# Patient Record
Sex: MALE | Race: WHITE | NOT HISPANIC OR LATINO | Employment: OTHER | URBAN - METROPOLITAN AREA
[De-identification: names, ages, dates, MRNs, and addresses within clinical notes are randomized per-mention and may not be internally consistent; named-entity substitution may affect disease eponyms.]

---

## 2017-03-27 ENCOUNTER — HOSPITAL ENCOUNTER (EMERGENCY)
Facility: HOSPITAL | Age: 77
Discharge: HOME | End: 2017-03-27
Attending: EMERGENCY MEDICINE | Admitting: EMERGENCY MEDICINE
Payer: COMMERCIAL

## 2017-03-27 VITALS
TEMPERATURE: 98 F | DIASTOLIC BLOOD PRESSURE: 81 MMHG | RESPIRATION RATE: 16 BRPM | OXYGEN SATURATION: 99 % | SYSTOLIC BLOOD PRESSURE: 181 MMHG | HEART RATE: 73 BPM

## 2017-03-27 DIAGNOSIS — R45.4 OUTBURSTS OF ANGER: Primary | ICD-10-CM

## 2017-03-27 PROCEDURE — 99283 EMERGENCY DEPT VISIT LOW MDM: CPT

## 2019-02-28 ENCOUNTER — APPOINTMENT (EMERGENCY)
Dept: RADIOLOGY | Facility: HOSPITAL | Age: 79
DRG: 871 | End: 2019-02-28
Payer: COMMERCIAL

## 2019-02-28 ENCOUNTER — APPOINTMENT (EMERGENCY)
Dept: CT IMAGING | Facility: HOSPITAL | Age: 79
DRG: 871 | End: 2019-02-28
Payer: COMMERCIAL

## 2019-02-28 ENCOUNTER — HOSPITAL ENCOUNTER (INPATIENT)
Facility: HOSPITAL | Age: 79
LOS: 5 days | Discharge: NURSING, REHAB OR LONG-TERM CARE | DRG: 871 | End: 2019-03-05
Attending: EMERGENCY MEDICINE | Admitting: INTERNAL MEDICINE
Payer: COMMERCIAL

## 2019-02-28 DIAGNOSIS — T83.021A: ICD-10-CM

## 2019-02-28 DIAGNOSIS — J18.9 PNEUMONIA: Primary | ICD-10-CM

## 2019-02-28 DIAGNOSIS — R33.9 URINARY RETENTION: ICD-10-CM

## 2019-02-28 DIAGNOSIS — A41.9 SEPSIS (HCC): ICD-10-CM

## 2019-02-28 DIAGNOSIS — J96.00 ACUTE RESPIRATORY FAILURE (HCC): ICD-10-CM

## 2019-02-28 LAB
ALBUMIN SERPL BCP-MCNC: 2.7 G/DL (ref 3.5–5)
ALP SERPL-CCNC: 54 U/L (ref 46–116)
ALT SERPL W P-5'-P-CCNC: 18 U/L (ref 12–78)
AMMONIA PLAS-SCNC: <10 UMOL/L (ref 11–35)
AMORPH URATE CRY URNS QL MICRO: ABNORMAL /HPF
AMPHETAMINES SERPL QL SCN: NEGATIVE
ANION GAP BLD CALC-SCNC: 15 MMOL/L (ref 4–13)
ANION GAP SERPL CALCULATED.3IONS-SCNC: 7 MMOL/L (ref 4–13)
APAP SERPL-MCNC: <2 UG/ML (ref 10–30)
APTT PPP: 41 SECONDS (ref 26–38)
ARTERIAL PATENCY WRIST A: YES
AST SERPL W P-5'-P-CCNC: 14 U/L (ref 5–45)
ATRIAL RATE: 111 BPM
BACTERIA UR QL AUTO: ABNORMAL /HPF
BARBITURATES UR QL: NEGATIVE
BASE EXCESS BLDA CALC-SCNC: -0.7 MMOL/L
BASE EXCESS BLDA CALC-SCNC: 1.2 MMOL/L
BASE EXCESS BLDA CALC-SCNC: 2.8 MMOL/L
BASOPHILS # BLD AUTO: 0.02 THOUSANDS/ΜL (ref 0–0.1)
BASOPHILS NFR BLD AUTO: 0 % (ref 0–1)
BENZODIAZ UR QL: POSITIVE
BILIRUB SERPL-MCNC: 0.57 MG/DL (ref 0.2–1)
BILIRUB UR QL STRIP: ABNORMAL
BUN BLD-MCNC: 20 MG/DL (ref 5–25)
BUN SERPL-MCNC: 23 MG/DL (ref 5–25)
CA-I BLD-SCNC: 1.2 MMOL/L (ref 1.12–1.32)
CALCIUM SERPL-MCNC: 8.6 MG/DL (ref 8.3–10.1)
CHLORIDE BLD-SCNC: 94 MMOL/L (ref 100–108)
CHLORIDE SERPL-SCNC: 97 MMOL/L (ref 100–108)
CLARITY UR: ABNORMAL
CO2 SERPL-SCNC: 32 MMOL/L (ref 21–32)
COCAINE UR QL: NEGATIVE
COLOR UR: ABNORMAL
CREAT BLD-MCNC: 1 MG/DL (ref 0.6–1.3)
CREAT SERPL-MCNC: 1.27 MG/DL (ref 0.6–1.3)
EOSINOPHIL # BLD AUTO: 0.03 THOUSAND/ΜL (ref 0–0.61)
EOSINOPHIL NFR BLD AUTO: 0 % (ref 0–6)
ERYTHROCYTE [DISTWIDTH] IN BLOOD BY AUTOMATED COUNT: 13.7 % (ref 11.6–15.1)
ETHANOL SERPL-MCNC: <3 MG/DL (ref 0–3)
GFR SERPL CREATININE-BSD FRML MDRD: 54 ML/MIN/1.73SQ M
GFR SERPL CREATININE-BSD FRML MDRD: 72 ML/MIN/1.73SQ M
GLUCOSE SERPL-MCNC: 131 MG/DL (ref 65–140)
GLUCOSE SERPL-MCNC: 178 MG/DL (ref 65–140)
GLUCOSE SERPL-MCNC: 182 MG/DL (ref 65–140)
GLUCOSE UR STRIP-MCNC: NEGATIVE MG/DL
HCO3 BLDA-SCNC: 27.5 MMOL/L (ref 22–28)
HCO3 BLDA-SCNC: 28.2 MMOL/L (ref 22–28)
HCO3 BLDA-SCNC: 28.7 MMOL/L (ref 22–28)
HCT VFR BLD AUTO: 37.2 % (ref 36.5–49.3)
HCT VFR BLD CALC: 37 % (ref 36.5–49.3)
HGB BLD-MCNC: 11.4 G/DL (ref 12–17)
HGB BLDA-MCNC: 12.6 G/DL (ref 12–17)
HGB UR QL STRIP.AUTO: ABNORMAL
HOROWITZ INDEX BLDA+IHG-RTO: 100 MM[HG]
HOROWITZ INDEX BLDA+IHG-RTO: 50 MM[HG]
HOROWITZ INDEX BLDA+IHG-RTO: 65 MM[HG]
IMM GRANULOCYTES # BLD AUTO: 0.08 THOUSAND/UL (ref 0–0.2)
IMM GRANULOCYTES NFR BLD AUTO: 1 % (ref 0–2)
INR PPP: 1.48 (ref 0.86–1.17)
KETONES UR STRIP-MCNC: NEGATIVE MG/DL
LACTATE SERPL-SCNC: 1.4 MMOL/L (ref 0.5–2)
LACTATE SERPL-SCNC: 2.1 MMOL/L (ref 0.5–2)
LEUKOCYTE ESTERASE UR QL STRIP: NEGATIVE
LYMPHOCYTES # BLD AUTO: 1.58 THOUSANDS/ΜL (ref 0.6–4.47)
LYMPHOCYTES NFR BLD AUTO: 11 % (ref 14–44)
MAGNESIUM SERPL-MCNC: 1.8 MG/DL (ref 1.6–2.6)
MCH RBC QN AUTO: 29.6 PG (ref 26.8–34.3)
MCHC RBC AUTO-ENTMCNC: 30.6 G/DL (ref 31.4–37.4)
MCV RBC AUTO: 97 FL (ref 82–98)
METHADONE UR QL: NEGATIVE
MONOCYTES # BLD AUTO: 1.17 THOUSAND/ΜL (ref 0.17–1.22)
MONOCYTES NFR BLD AUTO: 8 % (ref 4–12)
NEUTROPHILS # BLD AUTO: 11.64 THOUSANDS/ΜL (ref 1.85–7.62)
NEUTS SEG NFR BLD AUTO: 80 % (ref 43–75)
NITRITE UR QL STRIP: NEGATIVE
NON-SQ EPI CELLS URNS QL MICRO: ABNORMAL /HPF
NRBC BLD AUTO-RTO: 0 /100 WBCS
NT-PROBNP SERPL-MCNC: 415 PG/ML
O2 CT BLDA-SCNC: 14.3 ML/DL (ref 16–23)
O2 CT BLDA-SCNC: 15.1 ML/DL (ref 16–23)
O2 CT BLDA-SCNC: 16.4 ML/DL (ref 16–23)
OPIATES UR QL SCN: NEGATIVE
OXYHGB MFR BLDA: 95.5 % (ref 94–97)
OXYHGB MFR BLDA: 97.1 % (ref 94–97)
OXYHGB MFR BLDA: 98.7 % (ref 94–97)
P AXIS: 79 DEGREES
PCO2 BLD: 31 MMOL/L (ref 21–32)
PCO2 BLDA: 50.6 MM HG (ref 36–44)
PCO2 BLDA: 57 MM HG (ref 36–44)
PCO2 BLDA: 63.5 MM HG (ref 36–44)
PCP UR QL: NEGATIVE
PEEP RESPIRATORY: 5 CM[H2O]
PH BLDA: 7.25 [PH] (ref 7.35–7.45)
PH BLDA: 7.31 [PH] (ref 7.35–7.45)
PH BLDA: 7.37 [PH] (ref 7.35–7.45)
PH UR STRIP.AUTO: 5 [PH] (ref 4.5–8)
PLATELET # BLD AUTO: 199 THOUSANDS/UL (ref 149–390)
PMV BLD AUTO: 9 FL (ref 8.9–12.7)
PO2 BLDA: 109.2 MM HG (ref 75–129)
PO2 BLDA: 287 MM HG (ref 75–129)
PO2 BLDA: 84.5 MM HG (ref 75–129)
POTASSIUM BLD-SCNC: 4.2 MMOL/L (ref 3.5–5.3)
POTASSIUM SERPL-SCNC: 4.3 MMOL/L (ref 3.5–5.3)
PR INTERVAL: 144 MS
PROCALCITONIN SERPL-MCNC: 5.38 NG/ML
PROT SERPL-MCNC: 7.2 G/DL (ref 6.4–8.2)
PROT UR STRIP-MCNC: ABNORMAL MG/DL
PROTHROMBIN TIME: 18 SECONDS (ref 11.8–14.2)
QRS AXIS: 80 DEGREES
QRSD INTERVAL: 96 MS
QT INTERVAL: 306 MS
QTC INTERVAL: 402 MS
RBC # BLD AUTO: 3.85 MILLION/UL (ref 3.88–5.62)
RBC #/AREA URNS AUTO: ABNORMAL /HPF
SALICYLATES SERPL-MCNC: <3 MG/DL (ref 3–20)
SODIUM BLD-SCNC: 135 MMOL/L (ref 136–145)
SODIUM SERPL-SCNC: 136 MMOL/L (ref 136–145)
SP GR UR STRIP.AUTO: >=1.03 (ref 1–1.03)
SPECIMEN SOURCE: ABNORMAL
T WAVE AXIS: 94 DEGREES
T4 FREE SERPL-MCNC: 1.23 NG/DL (ref 0.76–1.46)
THC UR QL: NEGATIVE
TROPONIN I SERPL-MCNC: <0.02 NG/ML
TSH SERPL DL<=0.05 MIU/L-ACNC: 0.31 UIU/ML (ref 0.36–3.74)
UROBILINOGEN UR QL STRIP.AUTO: 1 E.U./DL
VENT AC: 12
VENT AC: 12
VENT AC: 20
VENT- AC: AC
VENTRICULAR RATE: 104 BPM
VT SETTING VENT: 400 ML
VT SETTING VENT: 450 ML
VT SETTING VENT: 450 ML
WBC # BLD AUTO: 14.52 THOUSAND/UL (ref 4.31–10.16)
WBC #/AREA URNS AUTO: ABNORMAL /HPF

## 2019-02-28 PROCEDURE — 80307 DRUG TEST PRSMV CHEM ANLYZR: CPT | Performed by: EMERGENCY MEDICINE

## 2019-02-28 PROCEDURE — 99291 CRITICAL CARE FIRST HOUR: CPT

## 2019-02-28 PROCEDURE — 81001 URINALYSIS AUTO W/SCOPE: CPT | Performed by: EMERGENCY MEDICINE

## 2019-02-28 PROCEDURE — 5A1935Z RESPIRATORY VENTILATION, LESS THAN 24 CONSECUTIVE HOURS: ICD-10-PCS | Performed by: INTERNAL MEDICINE

## 2019-02-28 PROCEDURE — 71275 CT ANGIOGRAPHY CHEST: CPT

## 2019-02-28 PROCEDURE — 80053 COMPREHEN METABOLIC PANEL: CPT | Performed by: EMERGENCY MEDICINE

## 2019-02-28 PROCEDURE — 82805 BLOOD GASES W/O2 SATURATION: CPT | Performed by: NURSE PRACTITIONER

## 2019-02-28 PROCEDURE — 96361 HYDRATE IV INFUSION ADD-ON: CPT

## 2019-02-28 PROCEDURE — 94644 CONT INHLJ TX 1ST HOUR: CPT

## 2019-02-28 PROCEDURE — 96365 THER/PROPH/DIAG IV INF INIT: CPT

## 2019-02-28 PROCEDURE — 84439 ASSAY OF FREE THYROXINE: CPT | Performed by: EMERGENCY MEDICINE

## 2019-02-28 PROCEDURE — 85610 PROTHROMBIN TIME: CPT | Performed by: EMERGENCY MEDICINE

## 2019-02-28 PROCEDURE — 36415 COLL VENOUS BLD VENIPUNCTURE: CPT | Performed by: EMERGENCY MEDICINE

## 2019-02-28 PROCEDURE — 70450 CT HEAD/BRAIN W/O DYE: CPT

## 2019-02-28 PROCEDURE — 83605 ASSAY OF LACTIC ACID: CPT | Performed by: EMERGENCY MEDICINE

## 2019-02-28 PROCEDURE — 85014 HEMATOCRIT: CPT

## 2019-02-28 PROCEDURE — 80177 DRUG SCRN QUAN LEVETIRACETAM: CPT | Performed by: EMERGENCY MEDICINE

## 2019-02-28 PROCEDURE — 93005 ELECTROCARDIOGRAM TRACING: CPT

## 2019-02-28 PROCEDURE — 36600 WITHDRAWAL OF ARTERIAL BLOOD: CPT

## 2019-02-28 PROCEDURE — 93010 ELECTROCARDIOGRAM REPORT: CPT | Performed by: INTERNAL MEDICINE

## 2019-02-28 PROCEDURE — 74177 CT ABD & PELVIS W/CONTRAST: CPT

## 2019-02-28 PROCEDURE — 84484 ASSAY OF TROPONIN QUANT: CPT | Performed by: EMERGENCY MEDICINE

## 2019-02-28 PROCEDURE — 85730 THROMBOPLASTIN TIME PARTIAL: CPT | Performed by: EMERGENCY MEDICINE

## 2019-02-28 PROCEDURE — 94002 VENT MGMT INPAT INIT DAY: CPT

## 2019-02-28 PROCEDURE — 82805 BLOOD GASES W/O2 SATURATION: CPT | Performed by: EMERGENCY MEDICINE

## 2019-02-28 PROCEDURE — 80320 DRUG SCREEN QUANTALCOHOLS: CPT | Performed by: EMERGENCY MEDICINE

## 2019-02-28 PROCEDURE — 94640 AIRWAY INHALATION TREATMENT: CPT

## 2019-02-28 PROCEDURE — 99291 CRITICAL CARE FIRST HOUR: CPT | Performed by: INTERNAL MEDICINE

## 2019-02-28 PROCEDURE — 84145 PROCALCITONIN (PCT): CPT | Performed by: NURSE PRACTITIONER

## 2019-02-28 PROCEDURE — 83880 ASSAY OF NATRIURETIC PEPTIDE: CPT | Performed by: EMERGENCY MEDICINE

## 2019-02-28 PROCEDURE — 87077 CULTURE AEROBIC IDENTIFY: CPT | Performed by: EMERGENCY MEDICINE

## 2019-02-28 PROCEDURE — 87040 BLOOD CULTURE FOR BACTERIA: CPT | Performed by: EMERGENCY MEDICINE

## 2019-02-28 PROCEDURE — 80329 ANALGESICS NON-OPIOID 1 OR 2: CPT | Performed by: EMERGENCY MEDICINE

## 2019-02-28 PROCEDURE — 82140 ASSAY OF AMMONIA: CPT | Performed by: EMERGENCY MEDICINE

## 2019-02-28 PROCEDURE — 85025 COMPLETE CBC W/AUTO DIFF WBC: CPT | Performed by: EMERGENCY MEDICINE

## 2019-02-28 PROCEDURE — 84443 ASSAY THYROID STIM HORMONE: CPT | Performed by: EMERGENCY MEDICINE

## 2019-02-28 PROCEDURE — 87631 RESP VIRUS 3-5 TARGETS: CPT | Performed by: NURSE PRACTITIONER

## 2019-02-28 PROCEDURE — 80047 BASIC METABLC PNL IONIZED CA: CPT

## 2019-02-28 PROCEDURE — 82948 REAGENT STRIP/BLOOD GLUCOSE: CPT

## 2019-02-28 PROCEDURE — 99292 CRITICAL CARE ADDL 30 MIN: CPT

## 2019-02-28 PROCEDURE — 71045 X-RAY EXAM CHEST 1 VIEW: CPT

## 2019-02-28 PROCEDURE — 83735 ASSAY OF MAGNESIUM: CPT | Performed by: EMERGENCY MEDICINE

## 2019-02-28 RX ORDER — LEVOTHYROXINE SODIUM 0.2 MG/1
200 TABLET ORAL DAILY
COMMUNITY

## 2019-02-28 RX ORDER — DOCUSATE SODIUM 100 MG/1
100 CAPSULE, LIQUID FILLED ORAL 2 TIMES DAILY
COMMUNITY

## 2019-02-28 RX ORDER — NITROGLYCERIN 0.4 MG/1
0.4 TABLET SUBLINGUAL
COMMUNITY

## 2019-02-28 RX ORDER — RISPERIDONE 1 MG/1
1 TABLET, FILM COATED ORAL 2 TIMES DAILY
COMMUNITY
End: 2019-03-05 | Stop reason: HOSPADM

## 2019-02-28 RX ORDER — LANOLIN ALCOHOL/MO/W.PET/CERES
1 CREAM (GRAM) TOPICAL DAILY
COMMUNITY

## 2019-02-28 RX ORDER — SODIUM CHLORIDE FOR INHALATION 0.9 %
3 VIAL, NEBULIZER (ML) INHALATION ONCE
Status: COMPLETED | OUTPATIENT
Start: 2019-02-28 | End: 2019-02-28

## 2019-02-28 RX ORDER — TRAMADOL HYDROCHLORIDE 50 MG/1
50 TABLET ORAL 2 TIMES DAILY
COMMUNITY
End: 2019-03-05 | Stop reason: HOSPADM

## 2019-02-28 RX ORDER — PROPOFOL 10 MG/ML
5 INJECTION, EMULSION INTRAVENOUS
Status: DISCONTINUED | OUTPATIENT
Start: 2019-02-28 | End: 2019-03-01

## 2019-02-28 RX ORDER — ARFORMOTEROL TARTRATE 15 UG/2ML
15 SOLUTION RESPIRATORY (INHALATION) 2 TIMES DAILY
COMMUNITY

## 2019-02-28 RX ORDER — GABAPENTIN 300 MG/1
100 CAPSULE ORAL 2 TIMES DAILY
COMMUNITY

## 2019-02-28 RX ORDER — MULTIVITAMIN
1 TABLET ORAL DAILY
COMMUNITY

## 2019-02-28 RX ORDER — SENNA PLUS 8.6 MG/1
1 TABLET ORAL 2 TIMES DAILY
COMMUNITY

## 2019-02-28 RX ORDER — PROPOFOL 10 MG/ML
INJECTION, EMULSION INTRAVENOUS
Status: COMPLETED
Start: 2019-02-28 | End: 2019-02-28

## 2019-02-28 RX ORDER — GUAIFENESIN 600 MG
1200 TABLET, EXTENDED RELEASE 12 HR ORAL EVERY 12 HOURS SCHEDULED
COMMUNITY

## 2019-02-28 RX ORDER — ACETAMINOPHEN 325 MG/1
650 TABLET ORAL EVERY 6 HOURS PRN
COMMUNITY

## 2019-02-28 RX ORDER — FUROSEMIDE 40 MG/1
40 TABLET ORAL DAILY
COMMUNITY

## 2019-02-28 RX ORDER — TAMSULOSIN HYDROCHLORIDE 0.4 MG/1
0.4 CAPSULE ORAL
COMMUNITY
End: 2019-03-05 | Stop reason: HOSPADM

## 2019-02-28 RX ORDER — LEVETIRACETAM 250 MG/1
500 TABLET ORAL EVERY 12 HOURS SCHEDULED
Status: DISCONTINUED | OUTPATIENT
Start: 2019-02-28 | End: 2019-02-28

## 2019-02-28 RX ORDER — BUDESONIDE 0.5 MG/2ML
0.5 INHALANT ORAL 2 TIMES DAILY
COMMUNITY

## 2019-02-28 RX ORDER — ALENDRONATE SODIUM 10 MG/1
10 TABLET ORAL
COMMUNITY

## 2019-02-28 RX ORDER — BUSPIRONE HYDROCHLORIDE 5 MG/1
5 TABLET ORAL 3 TIMES DAILY
COMMUNITY
End: 2019-03-05 | Stop reason: HOSPADM

## 2019-02-28 RX ORDER — LEVOTHYROXINE SODIUM 0.1 MG/1
200 TABLET ORAL
Status: DISCONTINUED | OUTPATIENT
Start: 2019-03-01 | End: 2019-03-05 | Stop reason: HOSPADM

## 2019-02-28 RX ORDER — OMEPRAZOLE 20 MG/1
20 CAPSULE, DELAYED RELEASE ORAL
COMMUNITY

## 2019-02-28 RX ORDER — GABAPENTIN 100 MG/1
100 CAPSULE ORAL 2 TIMES DAILY
Status: DISCONTINUED | OUTPATIENT
Start: 2019-02-28 | End: 2019-03-05 | Stop reason: HOSPADM

## 2019-02-28 RX ORDER — LEVETIRACETAM 500 MG/1
500 TABLET ORAL EVERY 12 HOURS SCHEDULED
COMMUNITY

## 2019-02-28 RX ORDER — OXCARBAZEPINE 300 MG/1
300 TABLET, FILM COATED ORAL
COMMUNITY

## 2019-02-28 RX ORDER — DILTIAZEM HYDROCHLORIDE 120 MG/1
120 CAPSULE, EXTENDED RELEASE ORAL DAILY
COMMUNITY

## 2019-02-28 RX ORDER — ISOSORBIDE MONONITRATE 60 MG/1
60 TABLET, EXTENDED RELEASE ORAL DAILY
COMMUNITY

## 2019-02-28 RX ORDER — CHLORHEXIDINE GLUCONATE 0.12 MG/ML
15 RINSE ORAL EVERY 12 HOURS SCHEDULED
Status: DISCONTINUED | OUTPATIENT
Start: 2019-02-28 | End: 2019-03-05 | Stop reason: HOSPADM

## 2019-02-28 RX ORDER — ONDANSETRON 4 MG/1
4 TABLET, FILM COATED ORAL EVERY 6 HOURS PRN
COMMUNITY

## 2019-02-28 RX ORDER — PROPOFOL 10 MG/ML
5 INJECTION, EMULSION INTRAVENOUS
Status: DISCONTINUED | OUTPATIENT
Start: 2019-02-28 | End: 2019-02-28

## 2019-02-28 RX ADMIN — PROPOFOL 5 MCG/KG/MIN: 10 INJECTION, EMULSION INTRAVENOUS at 15:00

## 2019-02-28 RX ADMIN — FENTANYL CITRATE 50 MCG/HR: 50 INJECTION, SOLUTION INTRAMUSCULAR; INTRAVENOUS at 14:22

## 2019-02-28 RX ADMIN — METRONIDAZOLE 500 MG: 500 INJECTION, SOLUTION INTRAVENOUS at 16:17

## 2019-02-28 RX ADMIN — VANCOMYCIN HYDROCHLORIDE 1750 MG: 1 INJECTION, POWDER, LYOPHILIZED, FOR SOLUTION INTRAVENOUS at 16:55

## 2019-02-28 RX ADMIN — CEFEPIME HYDROCHLORIDE 2000 MG: 1 INJECTION, POWDER, FOR SOLUTION INTRAMUSCULAR; INTRAVENOUS at 15:37

## 2019-02-28 RX ADMIN — ALBUTEROL SULFATE 10 MG: 2.5 SOLUTION RESPIRATORY (INHALATION) at 15:52

## 2019-02-28 RX ADMIN — IPRATROPIUM BROMIDE 1 MG: 0.5 SOLUTION RESPIRATORY (INHALATION) at 15:52

## 2019-02-28 RX ADMIN — PROPOFOL 5 MCG/KG/MIN: 10 INJECTION, EMULSION INTRAVENOUS at 15:36

## 2019-02-28 RX ADMIN — IOHEXOL 100 ML: 350 INJECTION, SOLUTION INTRAVENOUS at 15:24

## 2019-02-28 RX ADMIN — ISODIUM CHLORIDE 9 ML: 0.03 SOLUTION RESPIRATORY (INHALATION) at 15:52

## 2019-02-28 RX ADMIN — SODIUM CHLORIDE 1000 ML: 0.9 INJECTION, SOLUTION INTRAVENOUS at 13:52

## 2019-02-28 RX ADMIN — LEVETIRACETAM 500 MG: 100 INJECTION, SOLUTION INTRAVENOUS at 21:45

## 2019-02-28 NOTE — ED NOTES
Propofol not started at this time due to not having a weight on patient        Jordana Serrano RN  02/28/19 6831

## 2019-02-28 NOTE — H&P
History & Physical Exam - Critical Care   Dee Chow 66 y o  male MRN: [id number]  Unit/Bed#: ED 20 Encounter: [id number]      Assessment/Plan:  1  Sepsis due to hospital acquired pneumonia  · Will admit to critical care and will require greater than 2 midnight hospitalization stay  · Likely secondary to multilobar pneumonia  · BP responded to initial fluid bolus in ED  · Patient started on Cefepime, Vancomycin with concern for HCAP and flagyl with concern for aspiration  · Will likely perform bronchoscopy and sputum culture  · Blood cultures pending   · Patient with reported history of CHF, on 40mg lasix daily  Will start on light MF @ 75cc/hr as lactic has already cleared after 1L bolus  · Consider 5% albumin bolus if hypotension recurs   · Will consider adding vasoactive medication if patient develops refractory hypotension despite fluid bolus  2  Multilobar Pneumonia, possible HCAP  · Continue Cefepime, Vancomycin, and Flagyl until sputum culture returns  · Will check and trend procalcitonin  · Will send legionella, strep, and influenza  · Will continue ventilator support for hypoxia   · Will treat fevers with rectal tylenol  · Start xopenex and atrovent duoneb Q6  · Start solumedrol 60mg with history of COPD  · Pulmonary toilet and chest PT  3  Acute Hypoxic Respiratory Failure  · Continue mechanical ventilation   · AC 12/450/65/5 0/65%  · Continue to wean O2  4  Atrial Fibrillation  · Continue to monitor HR  · On diltiazem 120mg at home, consider restarting when appropriate  · Continue Eliquis  · HR goal <110  · Consider treating tachycardia with fluids before medical management  5  COPD  · Likely exacerbated by PNA  · Continue nebs  · Consider continuing Brovana nebs, home med  On advair daily  · Steroids  · Chest PT  6  GERD  · Start protonix ppx while intubated  7  Hypothyroid  · Continue synthroid, TSH 0 310  8   Reported CHF  · No know ECHO history  · Consider repeat  · Montior UO, restart lasix once BP stable  9  Possible Seizure D/O  · Continue Keppra 500mg  · Continue Gabapentin  · If patient encephalopathy does not resolve, consider seizure workup   10  Bipolar Disorder  · Continue Trileptal and Risperdal when appropriate  11  Possible urethral injury secondary to gao  · Concerning for injury  · May need urology consult  12  Chronic Hypotension  · Continue home isosorbide  · Monitor closely      Critical Care Time: 35 minutes  Documented critical care time excludes any procedures documented elsewhere  It also excludes any family updates    _____________________________________________________________________      HPI:    Ash Morales is a 66 y o  male with a history of COPD, heart failure, atrial fibrillation, chronic anticoagulation, and dementia presents to the ICU for management of pneumonia and acute hypoxic respiratory failure  Patient presents to the emergency department after apparent respiratory arrest at nursing home  According to the nursing home staff, patient had a witnessed collapse  He was walking with his walker and suddenly fell  He was unresponsive  Upon EMS arrival patient did have a pulse but was hypoxic and not breathing well  He was intubated EN route without sedation or paralytics  He was given Versed EN route for comfort  Upon arrival patient was slightly hypotensive  He received a 1 L fluid bolus in the emergency department  Sepsis was suspected the patient was started on broad-spectrum antibiotics including vancomycin, cefepime and Flagyl for concern of aspiration  Patient had a CT PE study including abdomen and pelvis which was negative for PE  He was found to have a right middle lobe consolidation with possible mucus plug or endobronchial lesion  Patient also with multifocal patchy consolidations in the right lower lobe that could be suspicious for aspiration pneumonia    Patient was started on propofol as well as fentanyl in the emergency department for sedation and analgesia  Patient was also found to have signs concerning for urethral Guzman balloon inflation  Patient is currently intubated and unable to answer questions  Of note patient was admitted to the hospital approximately 10 days ago for COPD exacerbation  Patient was treated with azithromycin, prednisone, and nebulizer treatments  He was afebrile with no leukocytosis at time of discharge and was discharged back to [address] [place] [address]  Review of Systems:  Review of Systems   Unable to perform ROS: Intubated     Full 12 point review of systems was performed  Aside from what was mentioned in the HPI, it is otherwise negative  Historical Information   Past Medical History:   Diagnosis Date    Acute respiratory failure with hypoxia (HCC)     Anemia     Atrial fibrillation (HCC)     Benign prostatic hyperplasia without lower urinary tract symptoms     Cardiac disease     Convulsion (HCC)     COPD (chronic obstructive pulmonary disease) (HCC)     Dementia     Disease of thyroid gland     Encephalopathy     GERD (gastroesophageal reflux disease)     Heart failure (HCC)     Hyperlipidemia     Hypo-osmolality and hyponatremia     Hypothyroidism     Peripheral vascular disease ([facility] [address] )     Psychiatric disorder     schizoaffective    Pulmonary collapse      No past surgical history on file  Social History   Social History     Substance and Sexual Activity   Alcohol Use No     Social History     Substance and Sexual Activity   Drug Use No     Social History     Tobacco Use   Smoking Status Former Smoker       Family History:   No family history on file      Medications:  Pertinent medications were reviewed    Current Facility-Administered Medications:  fentaNYL 50 mcg/hr Intravenous Continuous Sherice Stapleton DO Last Rate: 50 mcg/hr (02/28/19 1422)   propofol 5 mcg/kg/min Intravenous Titrated Sherice Stapleton DO    vancomycin 15 mg/kg Intravenous Once [employer], DO Last Rate: 1,750 mg (02/28/19 1655)         Allergies   Allergen Reactions    Cephalexin     Depakote [Valproic Acid]     Haldol [Haloperidol]     Lithium     Mesoridazine     Penicillins     Prolixin [Fluphenazine]     Thorazine [Chlorpromazine]     Tuberculin Tests          Vitals:   BP 99/54 (BP Location: Left arm)   Pulse 78   Temp 98 6 °F (37 °C) (Probe)   Resp 14   Wt 114 kg (252 lb 3 3 oz)   SpO2 100%   There is no height or weight on file to calculate BMI  SpO2: 100 %,   SpO2 Activity: At Rest,   O2 Device: (vent)      Intake/Output Summary (Last 24 hours) at 2/28/2019 1814  Last data filed at 2/28/2019 1650  Gross per 24 hour   Intake 1150 ml   Output 10 ml   Net 1140 ml     Invasive Devices     Peripheral Intravenous Line            Peripheral IV 02/28/19 Left Hand less than 1 day    Peripheral IV 02/28/19 Left Wrist less than 1 day    Peripheral IV 02/28/19 Right Antecubital less than 1 day    Peripheral IV 02/28/19 Right Hand less than 1 day          Drain            NG/OG/Enteral Tube Orogastric 16 Fr Center mouth less than 1 day    Urethral Catheter Temperature probe 16 Fr  less than 1 day          Airway            ETT  8 mm less than 1 day                Physical Exam:  Gen:  No acute distress, intubated, resting comfortably  HE:  No acute signs of head trauma  No bleeding from ears  ENT:  Pupils 2+, equal reactive, round, symmetric  No signs of nasal trauma  Patient intubated  Neck/lymph:  Neck supple, no lymphadenopathy  Chest:  Rhonchi noted in bilateral anterior upper lobes, crackles in bilateral anterior lower lobes  Cor: Heart RRR  No murmurs or rubs noted  Abd: Nondistended, decreased bowel sounds  Ext:  Trace lower extremity edema, nonpitting  Neuro: GCS  Skin: Warm and dry, no bruising noted  Blood noted in gao tubing      Diagnostic Data:  Lab: I have personally reviewed pertinent lab results  ,   CBC:  Results from last 7 days   Lab Units 02/28/19  1346 02/28/19  1340   WBC Thousand/uL  -- 14 52*   HEMOGLOBIN g/dL  --  11 4*   I STAT HEMOGLOBIN g/dl 12 6  --    HEMATOCRIT %  --  37 2   HEMATOCRIT, ISTAT % 37  --    PLATELETS Thousands/uL  --  199      CMP: Lab Results   Component Value Date    SODIUM 136 02/28/2019    K 4 3 02/28/2019    CL 97 (L) 02/28/2019    CO2 31 02/28/2019    BUN 23 02/28/2019    CREATININE 1 27 02/28/2019    GLUCOSE 182 (H) 02/28/2019    CALCIUM 8 6 02/28/2019    AST 14 02/28/2019    ALT 18 02/28/2019    ALKPHOS 54 02/28/2019    EGFR 72 02/28/2019   ,   PT/INR:   Lab Results   Component Value Date    INR 1 48 (H) 02/28/2019   ,   Troponin:   Lab Results   Component Value Date    TROPONINI <0 02 02/28/2019   ,   Magnesium: No components found for: MAG,  Phosphorous: No results found for: PHOS    ABG: Lab Results   Component Value Date    PHART 7 254 (L) 02/28/2019    AWX4CXR 63 5 (HH) 02/28/2019    PO2ART 287 0 (H) 02/28/2019    ZCJ9LKU 27 5 02/28/2019    BEART -0 7 02/28/2019    SOURCE Brachial, Right 02/28/2019   ,     Microbiology:  [facility] pending    Imaging: I have personally reviewed the pertinent imaging studies on the PACS system  2/28- CXR- Endotracheal tube tip 3 8 cm above the irma  Bibasilar right greater than left airspace disease concerning for pneumonia  Mild shifting of the cardiac silhouette toward the right could be related to atelectasis/right lung volume loss  2/28 CTH- No acute intracranial abnormality  2/28 CTA PE w/ A/P- Right middle lobe consolidation with air bronchograms which may represent bronchopneumonia  However, there is partial opacification of the proximal right middle lobe bronchus which may represent a mucous plug or endobronchial lesion  Bronchoscopy is   suggested for further evaluation  Multifocal patchy airspace consolidations are also noted within the right lower lobe suspicious for bronchopneumonia, possibly aspiration pneumonia    A repeat CT chest in 6-8 weeks line treatment is recommended to assess for improvement/resolution and exclude an underlying lesion      Scattered bilateral tree-in-bud opacities suspicious for an infectious or inflammatory bronchiolitis      No acute intra-abdominal abnormality  No free air or free fluid      Guzman catheter with the balloon portion located within the membranous urethra  Consider repositioning      Moderate amount of stool noted throughout the colon      Mild right hilar lymphadenopathy likely reactive in nature        EKG/telemetry/Echo:         VTE Prophylaxis: Heparin and SCD's    Code Status: No Order    Portions of the record may have been created with voice recognition software  Occasional wrong word or "sound a like" substitutions may have occurred due to the inherent limitations of voice recognition software  Read the chart carefully and recognize, using context, where substitutions have occurred

## 2019-02-28 NOTE — ED PROVIDER NOTES
History  Chief Complaint   Patient presents with    Respiratory Arrest     SOB from [facility], then went unresponsive  pt intubated on arrival      66 y o  M w/ h/o COPD, CHF, afib, seizures, schzioaffective disorder p/w unresponsiveness/resp distress  Per EMS, pt was complaining of resp distress and went unresponsive  Pt was intubated in the field without sedation (no gag per EMS)  Pt given 2mg versed after intubation  History provided by:  EMS personnel  History limited by:  Patient unresponsive and intubated   used: No        Prior to Admission Medications   Prescriptions Last Dose Informant Patient Reported? Taking? Linaclotide (LINZESS) 145 MCG CAPS   Yes Yes   Sig: Take 145 mcg by mouth daily   Multiple Vitamin (MULTIVITAMIN) tablet   Yes Yes   Sig: Take 1 tablet by mouth daily   NAPROXEN PO   Yes Yes   Sig: Take 550 mg by mouth 2 (two) times a day as needed   OXcarbazepine (TRILEPTAL) 300 mg tablet   Yes Yes   Sig: Take 300 mg by mouth daily at bedtime   acetaminophen (TYLENOL) 325 mg tablet   Yes Yes   Sig: Take 650 mg by mouth every 6 (six) hours as needed for mild pain   alendronate (FOSAMAX) 10 mg tablet   Yes Yes   Sig: Take 10 mg by mouth daily before breakfast   apixaban (ELIQUIS) 5 mg   Yes Yes   Sig: Take 5 mg by mouth 2 (two) times a day   arformoterol (BROVANA) 15 mcg/2 mL nebulizer solution   Yes Yes   Sig: Take 15 mcg by nebulization 2 (two) times a day   budesonide (PULMICORT) 0 5 mg/2 mL nebulizer solution   Yes Yes   Sig: Take 0 5 mg by nebulization 2 (two) times a day Rinse mouth after use     busPIRone (BUSPAR) 5 mg tablet   Yes Yes   Sig: Take 5 mg by mouth 3 (three) times a day   diltiazem (TIAZAC) 120 MG 24 hr capsule   Yes Yes   Sig: Take 120 mg by mouth daily   docusate sodium (COLACE) 100 mg capsule   Yes Yes   Sig: Take 100 mg by mouth 2 (two) times a day   fluticasone-salmeterol (ADVAIR) 250-50 mcg/dose inhaler   Yes Yes   Sig: Inhale 1 puff 2 (two) times a day Rinse mouth after use     furosemide (LASIX) 40 mg tablet   Yes Yes   Sig: Take 40 mg by mouth daily   gabapentin (NEURONTIN) 300 mg capsule   Yes Yes   Sig: Take 100 mg by mouth 2 (two) times a day   guaiFENesin (MUCINEX) 600 mg 12 hr tablet   Yes Yes   Sig: Take 1,200 mg by mouth every 12 (twelve) hours   isosorbide mononitrate (IMDUR) 60 mg 24 hr tablet   Yes Yes   Sig: Take 60 mg by mouth daily   levETIRAcetam (KEPPRA) 500 mg tablet   Yes Yes   Sig: Take 500 mg by mouth every 12 (twelve) hours   levothyroxine 200 mcg tablet   Yes Yes   Sig: Take 200 mcg by mouth daily   magnesium hydroxide (MILK OF MAGNESIA) 400 mg/5 mL oral suspension   Yes Yes   Sig: Take 30 mL by mouth daily as needed for constipation   nitroglycerin (NITROSTAT) 0 4 mg SL tablet   Yes Yes   Sig: Place 0 4 mg under the tongue every 5 (five) minutes as needed for chest pain   omeprazole (PriLOSEC) 20 mg delayed release capsule   Yes Yes   Sig: Take 20 mg by mouth daily at bedtime   ondansetron (ZOFRAN) 4 mg tablet   Yes Yes   Sig: Take 4 mg by mouth every 6 (six) hours as needed for nausea or vomiting   risperiDONE (RisperDAL) 1 mg tablet   Yes Yes   Sig: Take 1 mg by mouth 2 (two) times a day   senna (SENOKOT) 8 6 MG tablet   Yes Yes   Sig: Take 1 tablet by mouth 2 (two) times a day   tamsulosin (FLOMAX) 0 4 mg   Yes Yes   Sig: Take 0 4 mg by mouth daily with dinner   traMADol (ULTRAM) 50 mg tablet   Yes Yes   Sig: Take 50 mg by mouth 2 (two) times a day   white petrolatum-mineral oil (EUCERIN,HYDROCERIN) cream   Yes Yes   Sig: Apply 1 application topically daily      Facility-Administered Medications: None       Past Medical History:   Diagnosis Date    Acute respiratory failure with hypoxia (HCC)     Anemia     Atrial fibrillation (HCC)     Benign prostatic hyperplasia without lower urinary tract symptoms     Cardiac disease     Convulsion (HCC)     COPD (chronic obstructive pulmonary disease) ([facility] [address] )     Dementia     Disease of thyroid gland     Encephalopathy     GERD (gastroesophageal reflux disease)     Heart failure (HCC)     Hyperlipidemia     Hypo-osmolality and hyponatremia     Hypothyroidism     Peripheral vascular disease (HCC)     Psychiatric disorder     schizoaffective    Pulmonary collapse        No past surgical history on file  No family history on file  I have reviewed and agree with the history as documented  Social History     Tobacco Use    Smoking status: Former Smoker   Substance Use Topics    Alcohol use: No    Drug use: No        Review of Systems   Unable to perform ROS: Intubated       Physical Exam  Physical Exam   Constitutional: He appears well-developed and well-nourished  Non-toxic appearance  He does not have a sickly appearance  He does not appear ill  No distress  He is intubated  HENT:   Head: Normocephalic and atraumatic  Mouth/Throat: Oropharynx is clear and moist    Eyes: Right pupil is not reactive (Pinpoint)  Right pupil is round  Left pupil is not reactive (Pinpoint)  Left pupil is round  Neck: No JVD present  Cardiovascular: Regular rhythm, S1 normal, S2 normal, normal heart sounds, intact distal pulses and normal pulses  Tachycardia present  Exam reveals no gallop and no friction rub  No murmur heard  Pulmonary/Chest: He is intubated  He has no wheezes  He has rhonchi  He has rales  He exhibits no tenderness  Abdominal: Soft  Bowel sounds are normal  He exhibits distension  There is no tenderness  There is no rebound and no guarding  Musculoskeletal: He exhibits edema (b/l)  Neurological: He is unresponsive  GCS eye subscore is 1  GCS verbal subscore is 1  GCS motor subscore is 1  Skin: Skin is warm and dry  No rash noted  He is not diaphoretic  No pallor  Nursing note and vitals reviewed        Vital Signs  ED Triage Vitals   Temperature Pulse Respirations Blood Pressure SpO2   02/28/19 1447 02/28/19 1336 02/28/19 1349 02/28/19 1336 02/28/19 1336 Osmani Cates ) 96 3 °F (35 7 °C) (!) 108 12 105/54 96 %      Temp Source Heart Rate Source Patient Position - Orthostatic VS BP Location FiO2 (%)   02/28/19 1447 02/28/19 1541 02/28/19 1349 02/28/19 1349 --   Probe Monitor Lying Left arm       Pain Score       --                  Vitals:    02/28/19 1541 02/28/19 1547 02/28/19 1559 02/28/19 1606   BP: 100/57 111/58 102/55 107/58   Pulse: 72 75 72 75   Patient Position - Orthostatic VS: Lying Lying Lying Lying       Visual Acuity  Visual Acuity      Most Recent Value   L Pupil Size (mm)  2   R Pupil Size (mm)  2          ED Medications  Medications   fentaNYL 1250 mcg in sodium chloride 0 9% 125mL drip (50 mcg/hr Intravenous New Bag 2/28/19 1422)   propofol (DIPRIVAN) 1000 mg in 100 mL infusion (premix) (10 mcg/kg/min Intravenous Rate/Dose Change 2/28/19 1548)   propofol (DIPRIVAN) 1000 mg in 100 mL infusion (premix) **ADS Override Pull** (has no administration in time range)   vancomycin (VANCOCIN) 1,750 mg in sodium chloride 0 9 % 500 mL IVPB (has no administration in time range)   metroNIDAZOLE (FLAGYL) IVPB (premix) 500 mg (has no administration in time range)    EMS REPLENISHMENT MED ( Does not apply Given to EMS 2/28/19 1352)   sodium chloride 0 9 % bolus 1,000 mL (0 mL Intravenous Stopped 2/28/19 1432)   cefepime (MAXIPIME) 2 g/50 mL dextrose IVPB (0 mg Intravenous Stopped 2/28/19 1612)   iohexol (OMNIPAQUE) 350 MG/ML injection (MULTI-DOSE) 100 mL (100 mL Intravenous Given 2/28/19 1524)   albuterol inhalation solution 10 mg (10 mg Nebulization Given 2/28/19 1552)     And   ipratropium (ATROVENT) 0 02 % inhalation solution 1 mg (1 mg Nebulization Given 2/28/19 1552)     And   sodium chloride 0 9 % inhalation solution 3 mL (9 mL Nebulization Given 2/28/19 1552)       Diagnostic Studies  Results Reviewed     Procedure Component Value Units Date/Time    Lactic acid, plasma [[id number]] Collected:  02/28/19 1604    Lab Status:   In process Specimen:  Blood from Arm, Right Updated:  02/28/19 1611    Rapid drug screen, urine [[id number]]  (Abnormal) Collected:  02/28/19 1545    Lab Status:  Final result Specimen:  Urine, Catheter Updated:  02/28/19 1603     Amph/Meth UR Negative     Barbiturate Ur Negative     Benzodiazepine Urine Positive     Cocaine Urine Negative     Methadone Urine Negative     Opiate Urine Negative     PCP Ur Negative     THC Urine Negative    Narrative:       Presumptive report  If requested, specimen will be sent to reference lab for confirmation  FOR MEDICAL PURPOSES ONLY  IF CONFIRMATION NEEDED PLEASE CONTACT THE LAB WITHIN 5 DAYS  Drug Screen Cutoff Levels:  AMPHETAMINE/METHAMPHETAMINES  1000 ng/mL  BARBITURATES     200 ng/mL  BENZODIAZEPINES     200 ng/mL  COCAINE      300 ng/mL  METHADONE      300 ng/mL  OPIATES      300 ng/mL  PHENCYCLIDINE     25 ng/mL  THC       50 ng/mL    UA w Reflex to Microscopic w Reflex to Culture [[id number]]  (Abnormal) Collected:  02/28/19 1545    Lab Status:  Final result Specimen:  Urine, Indwelling Guzman Catheter Updated:  02/28/19 1554     Color, UA Alyssia     Clarity, UA Cloudy     Specific Gravity, UA >=1 030     pH, UA 5 0     Leukocytes, UA Negative     Nitrite, UA Negative     Protein,  (2+) mg/dl      Glucose, UA Negative mg/dl      Ketones, UA Negative mg/dl      Urobilinogen, UA 1 0 E U /dl      Bilirubin, UA Interference- unable to analyze     Blood, UA Large    Urine Microscopic [[id number]] Collected:  02/28/19 1545    Lab Status:   In process Specimen:  Urine, Indwelling Guzman Catheter Updated:  02/28/19 1554    Acetaminophen level [[id number]]  (Abnormal) Collected:  02/28/19 1340    Lab Status:  Final result Specimen:  Blood from Arm, Right Updated:  02/28/19 1544     Acetaminophen Level <2 ug/mL     TSH, 3rd generation with Free T4 reflex [[id number]]  (Abnormal) Collected:  02/28/19 1340    Lab Status:  Final result Specimen:  Blood from Arm, Right Updated:  02/28/19 1526     TSH 3RD GENERATON 0 310 uIU/mL Narrative:       Patients undergoing fluorescein dye angiography may retain small amounts of fluorescein in the body for 48-72 hours post procedure  Samples containing fluorescein can produce falsely depressed TSH values  If the patient had this procedure,a specimen should be resubmitted post fluorescein clearance  B-type natriuretic peptide [[id number]]  (Normal) Collected:  02/28/19 1340    Lab Status:  Final result Specimen:  Blood from Arm, Right Updated:  02/28/19 1526     NT-proBNP 415 pg/mL     Magnesium [[id number]]  (Normal) Collected:  02/28/19 1340    Lab Status:  Final result Specimen:  Blood from Arm, Right Updated:  02/28/19 1526     Magnesium 1 8 mg/dL     Salicylate level [[id number]]  (Abnormal) Collected:  02/28/19 1340    Lab Status:  Final result Specimen:  Blood from Arm, Right Updated:  89/45/07 8486     Salicylate Lvl <3 mg/dL     T4, free [[id number]] Collected:  02/28/19 1340    Lab Status:   In process Specimen:  Blood from Arm, Right Updated:  02/28/19 1526    Ammonia [[id number]]  (Abnormal) Collected:  02/28/19 1340    Lab Status:  Final result Specimen:  Blood from Arm, Right Updated:  02/28/19 1509     Ammonia <10 umol/L     Blood gas, arterial [[id number]]  (Abnormal) Collected:  02/28/19 1452    Lab Status:  Final result Specimen:  Blood, Arterial from Brachial, Right Updated:  02/28/19 1459     pH, Arterial 7 254     pCO2, Arterial 63 5 mm Hg      pO2, Arterial 287 0 mm Hg      HCO3, Arterial 27 5 mmol/L      Base Excess, Arterial -0 7 mmol/L      O2 Content, Arterial 16 4 mL/dL      O2 HGB,Arterial  98 7 %      SOURCE Brachial, Right     GINGER TEST Yes     Vent Type- AC AC     AC Rate 12     Tidal Volume 450 ml      Inspired Air (FIO2) 100     PEEP 5    Lactic acid, plasma [[id number]]  (Abnormal) Collected:  02/28/19 1412    Lab Status:  Final result Specimen:  Blood from Arm, Left Updated:  02/28/19 1456     LACTIC ACID 2 1 mmol/L     Narrative:       Result may be elevated if tourniquet was used during collection  Ethanol [[id number]]  (Normal) Collected:  02/28/19 1340    Lab Status:  Final result Specimen:  Blood from Arm, Right Updated:  02/28/19 1444     Ethanol Lvl <3 mg/dL     Troponin I [[id number]]  (Normal) Collected:  02/28/19 1340    Lab Status:  Final result Specimen:  Blood from Arm, Right Updated:  02/28/19 1435     Troponin I <0 02 ng/mL     Comprehensive metabolic panel [[id number]]  (Abnormal) Collected:  02/28/19 1340    Lab Status:  Final result Specimen:  Blood from Arm, Right Updated:  02/28/19 1434     Sodium 136 mmol/L      Potassium 4 3 mmol/L      Chloride 97 mmol/L      CO2 32 mmol/L      ANION GAP 7 mmol/L      BUN 23 mg/dL      Creatinine 1 27 mg/dL      Glucose 178 mg/dL      Calcium 8 6 mg/dL      AST 14 U/L      ALT 18 U/L      Alkaline Phosphatase 54 U/L      Total Protein 7 2 g/dL      Albumin 2 7 g/dL      Total Bilirubin 0 57 mg/dL      eGFR 54 ml/min/1 73sq m     Narrative:       National Kidney Disease Education Program recommendations are as follows:  GFR calculation is accurate only with a steady state creatinine  Chronic Kidney disease less than 60 ml/min/1 73 sq  meters  Kidney failure less than 15 ml/min/1 73 sq  meters  Blood culture #2 [[id number]] Collected:  02/28/19 1412    Lab Status: In process Specimen:  Blood from Arm, Left Updated:  02/28/19 1415    Protime-INR [[id number]]  (Abnormal) Collected:  02/28/19 1340    Lab Status:  Final result Specimen:  Blood from Arm, Right Updated:  02/28/19 1411     Protime 18 0 seconds      INR 1 48    APTT [[id number]]  (Abnormal) Collected:  02/28/19 1340    Lab Status:  Final result Specimen:  Blood from Arm, Right Updated:  02/28/19 1411     PTT 41 seconds     Blood culture #1 [[id number]] Collected:  02/28/19 1347    Lab Status:   In process Specimen:  Blood from Arm, Right Updated:  02/28/19 1403    POCT Chem 8+ [[id number]]  (Abnormal) Collected:  02/28/19 1346    Lab Status:  Final result Specimen:  Venous Updated:  02/28/19 1351     SODIUM, I-STAT 135 mmol/l      Potassium, i-STAT 4 2 mmol/L      Chloride, istat 94 mmol/L      CO2, i-STAT 31 mmol/L      Anion Gap, i-STAT 15 mmol/L      Calcium, Ionized i-STAT 1 20 mmol/L      BUN, I-STAT 20 mg/dl      Creatinine, i-STAT 1 0 mg/dl      eGFR 72 ml/min/1 73sq m      Glucose, i-STAT 182 mg/dl      Hct, i-STAT 37 %      Hgb, i-STAT 12 6 g/dl      Specimen Type VENOUS    CBC and differential [[id number]]  (Abnormal) Collected:  02/28/19 1340    Lab Status:  Final result Specimen:  Blood from Arm, Right Updated:  02/28/19 1349     WBC 14 52 Thousand/uL      RBC 3 85 Million/uL      Hemoglobin 11 4 g/dL      Hematocrit 37 2 %      MCV 97 fL      MCH 29 6 pg      MCHC 30 6 g/dL      RDW 13 7 %      MPV 9 0 fL      Platelets 306 Thousands/uL      nRBC 0 /100 WBCs      Neutrophils Relative 80 %      Immat GRANS % 1 %      Lymphocytes Relative 11 %      Monocytes Relative 8 %      Eosinophils Relative 0 %      Basophils Relative 0 %      Neutrophils Absolute 11 64 Thousands/µL      Immature Grans Absolute 0 08 Thousand/uL      Lymphocytes Absolute 1 58 Thousands/µL      Monocytes Absolute 1 17 Thousand/µL      Eosinophils Absolute 0 03 Thousand/µL      Basophils Absolute 0 02 Thousands/µL     Levetiracetam level [[id number]] Collected:  02/28/19 1340    Lab Status: In process Specimen:  Blood from Arm, Right Updated:  02/28/19 1345                 PE Study with CT Abdomen and Pelvis with contrast   Final Result by Lamont Pinto MD (02/28 1604)      Right middle lobe consolidation with air bronchograms which may represent bronchopneumonia  However, there is partial opacification of the proximal right middle lobe bronchus which may represent a mucous plug or endobronchial lesion  Bronchoscopy is    suggested for further evaluation    Multifocal patchy airspace consolidations are also noted within the right lower lobe suspicious for bronchopneumonia, possibly aspiration pneumonia  A repeat CT chest in 6-8 weeks line treatment is recommended to assess for improvement/resolution and exclude an underlying lesion  Scattered bilateral tree-in-bud opacities suspicious for an infectious or inflammatory bronchiolitis  No acute intra-abdominal abnormality  No free air or free fluid  Guzman catheter with the balloon portion located within the membranous urethra  Consider repositioning  Moderate amount of stool noted throughout the colon  Mild right hilar lymphadenopathy likely reactive in nature  Workstation performed: [id number]         CT head without contrast   Final Result by Taty Strong MD (02/28 8810)      No acute intracranial abnormality  Workstation performed: [id number]         XR chest 1 view portable   ED Interpretation by Annette Calvo DO (02/28 6920)   ET tube appears at irma  Will draw tube back                   Procedures  ECG 12 Lead Documentation  Date/Time: 2/28/2019 1:41 PM  Performed by: Annette Calvo DO  Authorized by: Annette Calvo DO     Indications / Diagnosis:  Unresponsive  ECG reviewed by me, the ED Provider: yes    Patient location:  Bedside  Rate:     ECG rate:  104    ECG rate assessment: tachycardic    Rhythm:     Rhythm: sinus tachycardia    Ectopy:     Ectopy: none    QRS:     QRS axis:  Normal    QRS intervals:  Normal  ST segments:     ST segments:  Normal  T waves:     T waves: non-specific      CriticalCare Time  Performed by: Annette Calvo DO  Authorized by: Annette Calvo DO     Critical care provider statement:     Critical care time (minutes):  75    Critical care time was exclusive of:  Separately billable procedures and treating other patients and teaching time    Critical care was necessary to treat or prevent imminent or life-threatening deterioration of the following conditions:  Respiratory failure and sepsis    Critical care was time spent personally by me on the following activities:  Blood draw for specimens, obtaining history from patient or surrogate, development of treatment plan with patient or surrogate, discussions with consultants, evaluation of patient's response to treatment, examination of patient, ordering and performing treatments and interventions, ordering and review of laboratory studies, ordering and review of radiographic studies, re-evaluation of patient's condition and review of old charts    I assumed direction of critical care for this patient from another provider in my specialty: no             Phone Contacts  ED Phone Contact    ED Course  ED Course as of Feb 28 1614   Thu Feb 28, 2019   1411 NH faxed over [employer], which states pt is DNR with comfort measures only but okay with trial period of artificial hydration and antibiotics  Pt has sister Ash [place], unsure of number at this point  1432 Respiratory on way down to pull tube 2cm up and to take pt to CT scan  1459 Sepsis alert called      [phone] Per records, pt was admitted at [facility] on 2/18/19 for a COPD exacerbation and placed on Z-fred upon discharge  1606 ICU attending paged                      Initial Sepsis Screening     [address] Name 02/28/19 1453                Is the patient's history suggestive of a new or worsening infection? Yes (Proceed)  (Abnormal)   -CR        Suspected source of infection  pneumonia;urinary tract infection  -CR        Are two or more of the following signs & symptoms of infection both present and new to the patient?   Yes (Proceed)  (Abnormal)   -CR        Indicate SIRS criteria  Tachycardia > 90 bpm;Leukocytosis (WBC > 72[address] IJL)  -CR        If the answer is yes to both questions, suspicion of sepsis is present          If severe sepsis is present AND tissue hypoperfusion perists in the hour after fluid resuscitation or lactate > 4, the patient meets criteria for SEPTIC SHOCK          Are any of the following organ dysfunction criteria present within 6 hours of suspected infection and SIRS criteria that are NOT considered to be chronic conditions? Yes  (Abnormal)   -CR        Organ dysfunction  Lactate > 2 0 mmol/L  -CR        Date of presentation of severe sepsis          Time of presentation of severe sepsis          Tissue hypoperfusion persists in the hour after crystalloid fluid administration, evidenced, by either:          Was hypotension present within one hour of the conclusion of crystalloid fluid administration?         Date of presentation of septic shock          Time of presentation of septic shock            User Key  (r) = Recorded By, (t) = Taken By, (c) = Cosigned By    Initials Name Provider Type    HALIMA Calvo, DO Physician                  MDM    Disposition  Final diagnoses:   Pneumonia   Acute respiratory failure ([facility] [address] )   Sepsis ([facility] [address] )     Time reflects when diagnosis was documented in both MDM as applicable and the Disposition within this note     Time User Action Codes Description Comment    2/28/2019  4:04 PM Greg Stapleton 48 [J18 [id number]] Pneumonia     2/28/2019  4:04 PM Sherice Stapleton [J96 00] Acute respiratory failure ([facility] [address] )     2/28/2019  4:04 PM Sherice Stapleton [A41 [id number]] Sepsis [facility])       ED Disposition     ED Disposition Condition Date/Time Comment    Admit Stable Thu Feb 28, 2019  4:07 PM Case was discussed with Dr Yamel Waters and the patient's admission status was agreed to be Admission Status: inpatient status to the service of Dr Yamel Waters  Follow-up Information    None         Patient's Medications   Discharge Prescriptions    No medications on file     No discharge procedures on file      ED Provider  Electronically Signed by           Annette Calvo,   02/28/19 1007 [facility]   02/28/19 8259

## 2019-02-28 NOTE — ED NOTES
Dr Yvonne Chiang made aware of pt's pressure    VO to hang 1L NSS     Symone Stokes, RN  02/28/19 6508

## 2019-03-01 ENCOUNTER — APPOINTMENT (INPATIENT)
Dept: NON INVASIVE DIAGNOSTICS | Facility: HOSPITAL | Age: 79
DRG: 871 | End: 2019-03-01
Payer: COMMERCIAL

## 2019-03-01 PROBLEM — J96.00 ACUTE RESPIRATORY FAILURE (HCC): Status: ACTIVE | Noted: 2019-03-01

## 2019-03-01 PROBLEM — J44.9 COPD (CHRONIC OBSTRUCTIVE PULMONARY DISEASE) (HCC): Status: ACTIVE | Noted: 2019-03-01

## 2019-03-01 PROBLEM — J18.9 PNEUMONIA: Status: ACTIVE | Noted: 2019-03-01

## 2019-03-01 PROBLEM — T83.021A: Status: ACTIVE | Noted: 2019-03-01

## 2019-03-01 PROBLEM — E03.9 HYPOTHYROID: Status: ACTIVE | Noted: 2019-03-01

## 2019-03-01 PROBLEM — R09.2 RESPIRATORY ARREST (HCC): Status: ACTIVE | Noted: 2019-03-01

## 2019-03-01 PROBLEM — I48.91 ATRIAL FIBRILLATION (HCC): Status: ACTIVE | Noted: 2019-03-01

## 2019-03-01 LAB
ANION GAP SERPL CALCULATED.3IONS-SCNC: 6 MMOL/L (ref 4–13)
ARTERIAL PATENCY WRIST A: YES
BACTERIA UR QL AUTO: ABNORMAL /HPF
BASE EXCESS BLDA CALC-SCNC: 0.3 MMOL/L
BASOPHILS # BLD AUTO: 0.02 THOUSANDS/ΜL (ref 0–0.1)
BASOPHILS NFR BLD AUTO: 0 % (ref 0–1)
BILIRUB UR QL STRIP: NEGATIVE
BUN SERPL-MCNC: 22 MG/DL (ref 5–25)
CA-I BLD-SCNC: 1.06 MMOL/L (ref 1.12–1.32)
CALCIUM SERPL-MCNC: 8 MG/DL (ref 8.3–10.1)
CHLORIDE SERPL-SCNC: 103 MMOL/L (ref 100–108)
CLARITY UR: ABNORMAL
CO2 SERPL-SCNC: 29 MMOL/L (ref 21–32)
COLOR UR: YELLOW
CREAT SERPL-MCNC: 0.96 MG/DL (ref 0.6–1.3)
EOSINOPHIL # BLD AUTO: 0.13 THOUSAND/ΜL (ref 0–0.61)
EOSINOPHIL NFR BLD AUTO: 2 % (ref 0–6)
ERYTHROCYTE [DISTWIDTH] IN BLOOD BY AUTOMATED COUNT: 13.9 % (ref 11.6–15.1)
FLUAV AG SPEC QL: NOT DETECTED
FLUBV AG SPEC QL: NOT DETECTED
GFR SERPL CREATININE-BSD FRML MDRD: 75 ML/MIN/1.73SQ M
GLUCOSE SERPL-MCNC: 101 MG/DL (ref 65–140)
GLUCOSE SERPL-MCNC: 103 MG/DL (ref 65–140)
GLUCOSE SERPL-MCNC: 107 MG/DL (ref 65–140)
GLUCOSE SERPL-MCNC: 109 MG/DL (ref 65–140)
GLUCOSE SERPL-MCNC: 160 MG/DL (ref 65–140)
GLUCOSE SERPL-MCNC: 166 MG/DL (ref 65–140)
GLUCOSE UR STRIP-MCNC: NEGATIVE MG/DL
HCO3 BLDA-SCNC: 25.2 MMOL/L (ref 22–28)
HCT VFR BLD AUTO: 27.9 % (ref 36.5–49.3)
HGB BLD-MCNC: 10.8 G/DL (ref 12–17)
HGB BLD-MCNC: 8.7 G/DL (ref 12–17)
HGB UR QL STRIP.AUTO: ABNORMAL
HOROWITZ INDEX BLDA+IHG-RTO: 50 MM[HG]
IMM GRANULOCYTES # BLD AUTO: 0.05 THOUSAND/UL (ref 0–0.2)
IMM GRANULOCYTES NFR BLD AUTO: 1 % (ref 0–2)
KETONES UR STRIP-MCNC: NEGATIVE MG/DL
L PNEUMO1 AG UR QL IA.RAPID: NEGATIVE
LEUKOCYTE ESTERASE UR QL STRIP: NEGATIVE
LYMPHOCYTES # BLD AUTO: 0.83 THOUSANDS/ΜL (ref 0.6–4.47)
LYMPHOCYTES NFR BLD AUTO: 10 % (ref 14–44)
MAGNESIUM SERPL-MCNC: 1.7 MG/DL (ref 1.6–2.6)
MCH RBC QN AUTO: 30.1 PG (ref 26.8–34.3)
MCHC RBC AUTO-ENTMCNC: 31.2 G/DL (ref 31.4–37.4)
MCV RBC AUTO: 97 FL (ref 82–98)
MONOCYTES # BLD AUTO: 0.72 THOUSAND/ΜL (ref 0.17–1.22)
MONOCYTES NFR BLD AUTO: 9 % (ref 4–12)
NEUTROPHILS # BLD AUTO: 6.65 THOUSANDS/ΜL (ref 1.85–7.62)
NEUTS SEG NFR BLD AUTO: 78 % (ref 43–75)
NITRITE UR QL STRIP: NEGATIVE
NON-SQ EPI CELLS URNS QL MICRO: ABNORMAL /HPF
NRBC BLD AUTO-RTO: 0 /100 WBCS
O2 CT BLDA-SCNC: 12.6 ML/DL (ref 16–23)
OXYHGB MFR BLDA: 96.1 % (ref 94–97)
PCO2 BLDA: 41.8 MM HG (ref 36–44)
PEEP RESPIRATORY: 5 CM[H2O]
PH BLDA: 7.4 [PH] (ref 7.35–7.45)
PH UR STRIP.AUTO: 5.5 [PH] (ref 4.5–8)
PHOSPHATE SERPL-MCNC: 2.2 MG/DL (ref 2.3–4.1)
PLATELET # BLD AUTO: 143 THOUSANDS/UL (ref 149–390)
PMV BLD AUTO: 9.3 FL (ref 8.9–12.7)
PO2 BLDA: 103.8 MM HG (ref 75–129)
POTASSIUM SERPL-SCNC: 3.9 MMOL/L (ref 3.5–5.3)
PROT UR STRIP-MCNC: ABNORMAL MG/DL
RBC # BLD AUTO: 2.89 MILLION/UL (ref 3.88–5.62)
RBC #/AREA URNS AUTO: ABNORMAL /HPF
RSV B RNA SPEC QL NAA+PROBE: NOT DETECTED
S PNEUM AG UR QL: NEGATIVE
SODIUM SERPL-SCNC: 138 MMOL/L (ref 136–145)
SP GR UR STRIP.AUTO: 1.01 (ref 1–1.03)
SPECIMEN SOURCE: ABNORMAL
UROBILINOGEN UR QL STRIP.AUTO: 0.2 E.U./DL
VENT AC: 20
VENT- AC: AC
VT SETTING VENT: 400 ML
WBC # BLD AUTO: 8.4 THOUSAND/UL (ref 4.31–10.16)
WBC #/AREA URNS AUTO: ABNORMAL /HPF

## 2019-03-01 PROCEDURE — 36600 WITHDRAWAL OF ARTERIAL BLOOD: CPT

## 2019-03-01 PROCEDURE — 0T9B30Z DRAINAGE OF BLADDER WITH DRAINAGE DEVICE, PERCUTANEOUS APPROACH: ICD-10-PCS | Performed by: UROLOGY

## 2019-03-01 PROCEDURE — 51102 DRAIN BL W/CATH INSERTION: CPT | Performed by: UROLOGY

## 2019-03-01 PROCEDURE — 93306 TTE W/DOPPLER COMPLETE: CPT

## 2019-03-01 PROCEDURE — 36415 COLL VENOUS BLD VENIPUNCTURE: CPT | Performed by: NURSE PRACTITIONER

## 2019-03-01 PROCEDURE — 99233 SBSQ HOSP IP/OBS HIGH 50: CPT | Performed by: INTERNAL MEDICINE

## 2019-03-01 PROCEDURE — 80048 BASIC METABOLIC PNL TOTAL CA: CPT | Performed by: NURSE PRACTITIONER

## 2019-03-01 PROCEDURE — 99232 SBSQ HOSP IP/OBS MODERATE 35: CPT | Performed by: UROLOGY

## 2019-03-01 PROCEDURE — 82948 REAGENT STRIP/BLOOD GLUCOSE: CPT

## 2019-03-01 PROCEDURE — G8997 SWALLOW GOAL STATUS: HCPCS

## 2019-03-01 PROCEDURE — G8996 SWALLOW CURRENT STATUS: HCPCS

## 2019-03-01 PROCEDURE — 93306 TTE W/DOPPLER COMPLETE: CPT | Performed by: INTERNAL MEDICINE

## 2019-03-01 PROCEDURE — 85025 COMPLETE CBC W/AUTO DIFF WBC: CPT | Performed by: NURSE PRACTITIONER

## 2019-03-01 PROCEDURE — 94640 AIRWAY INHALATION TREATMENT: CPT

## 2019-03-01 PROCEDURE — 81001 URINALYSIS AUTO W/SCOPE: CPT | Performed by: NURSE PRACTITIONER

## 2019-03-01 PROCEDURE — 83735 ASSAY OF MAGNESIUM: CPT | Performed by: NURSE PRACTITIONER

## 2019-03-01 PROCEDURE — 84100 ASSAY OF PHOSPHORUS: CPT | Performed by: NURSE PRACTITIONER

## 2019-03-01 PROCEDURE — 85018 HEMOGLOBIN: CPT | Performed by: NURSE PRACTITIONER

## 2019-03-01 PROCEDURE — 87449 NOS EACH ORGANISM AG IA: CPT | Performed by: NURSE PRACTITIONER

## 2019-03-01 PROCEDURE — 94760 N-INVAS EAR/PLS OXIMETRY 1: CPT

## 2019-03-01 PROCEDURE — 82330 ASSAY OF CALCIUM: CPT | Performed by: NURSE PRACTITIONER

## 2019-03-01 PROCEDURE — 99222 1ST HOSP IP/OBS MODERATE 55: CPT | Performed by: UROLOGY

## 2019-03-01 PROCEDURE — 92610 EVALUATE SWALLOWING FUNCTION: CPT

## 2019-03-01 PROCEDURE — 82805 BLOOD GASES W/O2 SATURATION: CPT | Performed by: NURSE PRACTITIONER

## 2019-03-01 RX ORDER — FENTANYL CITRATE 50 UG/ML
50 INJECTION, SOLUTION INTRAMUSCULAR; INTRAVENOUS ONCE
Status: COMPLETED | OUTPATIENT
Start: 2019-03-01 | End: 2019-03-01

## 2019-03-01 RX ORDER — DILTIAZEM HYDROCHLORIDE 120 MG/1
120 CAPSULE, COATED, EXTENDED RELEASE ORAL DAILY
Status: DISCONTINUED | OUTPATIENT
Start: 2019-03-01 | End: 2019-03-05 | Stop reason: HOSPADM

## 2019-03-01 RX ORDER — OXCARBAZEPINE 300 MG/1
300 TABLET, FILM COATED ORAL
Status: DISCONTINUED | OUTPATIENT
Start: 2019-03-01 | End: 2019-03-05 | Stop reason: HOSPADM

## 2019-03-01 RX ORDER — FENTANYL CITRATE 50 UG/ML
INJECTION, SOLUTION INTRAMUSCULAR; INTRAVENOUS
Status: COMPLETED
Start: 2019-03-01 | End: 2019-03-01

## 2019-03-01 RX ORDER — BUDESONIDE 0.5 MG/2ML
0.5 INHALANT ORAL
Status: DISCONTINUED | OUTPATIENT
Start: 2019-03-01 | End: 2019-03-05 | Stop reason: HOSPADM

## 2019-03-01 RX ORDER — TRAMADOL HYDROCHLORIDE 50 MG/1
50 TABLET ORAL 2 TIMES DAILY
Status: DISCONTINUED | OUTPATIENT
Start: 2019-03-01 | End: 2019-03-05 | Stop reason: HOSPADM

## 2019-03-01 RX ORDER — METHYLPREDNISOLONE SODIUM SUCCINATE 40 MG/ML
40 INJECTION, POWDER, LYOPHILIZED, FOR SOLUTION INTRAMUSCULAR; INTRAVENOUS EVERY 8 HOURS SCHEDULED
Status: DISCONTINUED | OUTPATIENT
Start: 2019-03-01 | End: 2019-03-02

## 2019-03-01 RX ORDER — LEVALBUTEROL 1.25 MG/.5ML
1.25 SOLUTION, CONCENTRATE RESPIRATORY (INHALATION)
Status: DISCONTINUED | OUTPATIENT
Start: 2019-03-01 | End: 2019-03-05 | Stop reason: HOSPADM

## 2019-03-01 RX ORDER — ALBUTEROL SULFATE 2.5 MG/3ML
2.5 SOLUTION RESPIRATORY (INHALATION) EVERY 6 HOURS PRN
Status: DISCONTINUED | OUTPATIENT
Start: 2019-03-01 | End: 2019-03-05 | Stop reason: HOSPADM

## 2019-03-01 RX ADMIN — CEFEPIME HYDROCHLORIDE 2000 MG: 1 INJECTION, POWDER, FOR SOLUTION INTRAMUSCULAR; INTRAVENOUS at 03:29

## 2019-03-01 RX ADMIN — VANCOMYCIN HYDROCHLORIDE 1750 MG: 1 INJECTION, POWDER, LYOPHILIZED, FOR SOLUTION INTRAVENOUS at 05:27

## 2019-03-01 RX ADMIN — DILTIAZEM HYDROCHLORIDE 120 MG: 120 CAPSULE, COATED, EXTENDED RELEASE ORAL at 18:17

## 2019-03-01 RX ADMIN — CHLORHEXIDINE GLUCONATE 15 ML: 1.2 RINSE ORAL at 20:07

## 2019-03-01 RX ADMIN — METRONIDAZOLE 500 MG: 500 INJECTION, SOLUTION INTRAVENOUS at 18:17

## 2019-03-01 RX ADMIN — IPRATROPIUM BROMIDE 0.5 MG: 0.5 SOLUTION RESPIRATORY (INHALATION) at 19:30

## 2019-03-01 RX ADMIN — FENTANYL CITRATE 50 MCG: 50 INJECTION, SOLUTION INTRAMUSCULAR; INTRAVENOUS at 04:13

## 2019-03-01 RX ADMIN — METRONIDAZOLE 500 MG: 500 INJECTION, SOLUTION INTRAVENOUS at 00:25

## 2019-03-01 RX ADMIN — METRONIDAZOLE 500 MG: 500 INJECTION, SOLUTION INTRAVENOUS at 08:44

## 2019-03-01 RX ADMIN — TRAMADOL HYDROCHLORIDE 50 MG: 50 TABLET, COATED ORAL at 20:07

## 2019-03-01 RX ADMIN — LEVETIRACETAM 500 MG: 100 INJECTION, SOLUTION INTRAVENOUS at 10:56

## 2019-03-01 RX ADMIN — METHYLPREDNISOLONE SODIUM SUCCINATE 40 MG: 40 INJECTION, POWDER, FOR SOLUTION INTRAMUSCULAR; INTRAVENOUS at 11:05

## 2019-03-01 RX ADMIN — METHYLPREDNISOLONE SODIUM SUCCINATE 40 MG: 40 INJECTION, POWDER, FOR SOLUTION INTRAMUSCULAR; INTRAVENOUS at 21:36

## 2019-03-01 RX ADMIN — CEFEPIME HYDROCHLORIDE 2000 MG: 1 INJECTION, POWDER, FOR SOLUTION INTRAMUSCULAR; INTRAVENOUS at 19:20

## 2019-03-01 RX ADMIN — VANCOMYCIN HYDROCHLORIDE 1750 MG: 1 INJECTION, POWDER, LYOPHILIZED, FOR SOLUTION INTRAVENOUS at 20:08

## 2019-03-01 RX ADMIN — OXCARBAZEPINE 300 MG: 300 TABLET, FILM COATED ORAL at 21:36

## 2019-03-01 RX ADMIN — INSULIN LISPRO 1 UNITS: 100 INJECTION, SOLUTION INTRAVENOUS; SUBCUTANEOUS at 21:36

## 2019-03-01 RX ADMIN — LEVALBUTEROL 1.25 MG: 1.25 SOLUTION, CONCENTRATE RESPIRATORY (INHALATION) at 19:30

## 2019-03-01 RX ADMIN — LEVETIRACETAM 500 MG: 100 INJECTION, SOLUTION INTRAVENOUS at 22:36

## 2019-03-01 RX ADMIN — METRONIDAZOLE 500 MG: 500 INJECTION, SOLUTION INTRAVENOUS at 23:55

## 2019-03-01 RX ADMIN — BUDESONIDE 0.5 MG: 0.5 INHALANT RESPIRATORY (INHALATION) at 19:30

## 2019-03-01 RX ADMIN — PROPOFOL 5 MCG/KG/MIN: 10 INJECTION, EMULSION INTRAVENOUS at 03:37

## 2019-03-01 RX ADMIN — GABAPENTIN 100 MG: 100 CAPSULE ORAL at 18:17

## 2019-03-01 NOTE — ED NOTES
Blood noted near insertion site of gao catheter and no urine in catheter bag  Bladder scanned for 173ml urine  Notified ICU provider of findings       Bibi Maldonado RN  02/28/19 2056

## 2019-03-01 NOTE — SPEECH THERAPY NOTE
Speech Language/Pathology  Speech/Language Pathology  Assessment    Patient Name: Socorro Hernandez  OLIDM'E Date: 3/1/2019     Problem List  Patient Active Problem List   Diagnosis    [facility]placement of Guzman catheter [facility]    Acute respiratory failure ([facility]    COPD (chronic obstructive pulmonary disease) ([facility]    Pneumonia    Hypothyroid    Atrial fibrillation ([facility]    Respiratory arrest ([facility]     Past Medical History  Past Medical History:   Diagnosis Date    Acute respiratory failure with hypoxia ([facility]     Anemia     Atrial fibrillation (HCC)     Benign prostatic hyperplasia without lower urinary tract symptoms     Cardiac disease     Convulsion (HCC)     COPD (chronic obstructive pulmonary disease) ([facility]     Dementia     Disease of thyroid gland     Encephalopathy     GERD (gastroesophageal reflux disease)     Heart failure (HCC)     Hyperlipidemia     Hypo-osmolality and hyponatremia     Hypothyroidism     Peripheral vascular disease ([facility]     Psychiatric disorder     schizoaffective    Pulmonary collapse      Past Surgical History  No past surgical history on file  HPI per MD:    Socorro Hernandez is a 66 y o  male with a history of COPD, heart failure, atrial fibrillation, chronic anticoagulation, and dementia presents to the ICU for management of pneumonia and acute hypoxic respiratory failure  Patient presents to the emergency department after apparent respiratory arrest at nursing home  According to the nursing home staff, patient had a witnessed collapse  He was walking with his walker and suddenly fell  He was unresponsive  Upon EMS arrival patient did have a pulse but was hypoxic and not breathing well  He was intubated EN route without sedation or paralytics  He was given Versed EN route for comfort  Upon arrival patient was slightly hypotensive  He received a 1 L fluid bolus in the emergency department    Sepsis was suspected the patient was started on broad-spectrum antibiotics including vancomycin, cefepime and Flagyl for concern of aspiration  Patient had a CT PE study including abdomen and pelvis which was negative for PE  He was found to have a right middle lobe consolidation with possible mucus plug or endobronchial lesion  Patient also with multifocal patchy consolidations in the right lower lobe that could be suspicious for aspiration pneumonia  Patient was started on propofol as well as fentanyl in the emergency department for sedation and analgesia  Patient was also found to have signs concerning for urethral Guzman balloon inflation  Patient is currently intubated and unable to answer questions      Of note patient was admitted to the hospital approximately 10 days ago for COPD exacerbation  Patient was treated with azithromycin, prednisone, and nebulizer treatments  He was afebrile with no leukocytosis at time of discharge and was discharged back to [facility]  Special Studies   CTA 2/28/19  IMPRESSION:  Right middle lobe consolidation with air bronchograms which may represent bronchopneumonia  However, there is partial opacification of the proximal right middle lobe bronchus which may represent a mucous plug or endobronchial lesion  Bronchoscopy is   suggested for further evaluation  Multifocal patchy airspace consolidations are also noted within the right lower lobe suspicious for bronchopneumonia, possibly aspiration pneumonia  A repeat CT chest in 6-8 weeks line treatment is recommended to assess for improvement/resolution and exclude an underlying lesion  Bedside Swallow Evaluation:    Summary:  Patient extubated this morning  Pt presents w/ moderate to severe pharyngeal dysphagia  He was agreeable to PO trials  Patient was upright in bed with adequate positioning for safe PO trials  SLP presented patient with trials puree   Mildly decreased bolus manipulation and decreased control, but overall was able to tolerate puree trials with no overt s/s of aspiration  He had mild to moderately prolonged mastication with the mechanical soft textures with reduced control and formation  Cough x2 noted with the ovidio cracker  Mild to mod oral residue noted  SLP presented patient with trials of thin liquids  He had a significant coughing episode which resulted him coughing up phlegm  His voice sounded very wet after trials of thin  SLP trialed nectar thick liquids  Patient continued to have s/s of aspiration with nectar thick liquids  Trials of honey thick liquids  Patient did not have any s/s of aspiration with HTL  Recommendations of Dysphagia level 1- puree and honey thick liquids  Results were discussed with nursing and patient  Decreased understanding of why he was being recommended for a different diet, SLP continued to provide education to the patient regarding his diet change  ST to continue to follow  Recommendations:  Diet: Dysphagia level 1- puree   Liquid: Honey thick liquids   Meds: Crushed with puree   Supervision: Assist with set-up  Positioning:Upright  Strategies: Pt to take PO/Meds only when fully alert and upright  Oral care: to be completed daily   Aspiration precautions  Reflux precautions    Therapy Prognosis:  Prognosis considerations: Fair   Frequency:  F/u 3-5 per week    Patient's goal:  He wanted mac and cheese and something to eat       Reason for consult:  R/o aspiration  Determine safest and least restrictive diet  respiratory compromise  Patient was extubated this morning, 3/1/19      Precautions:  None     Current diet:  NPO    Premorbid diet:  He reported that he was consuming a regular diet and thin liquids     He also stated that he is vegetarian     Previous VBS:  Not noted in the records     O2 requirement:  5L NC    Voice/Speech:  Mildly dysarthric, but intelligble    Social:  Lives at [facility] commands:  [facility]                          Cognitive Status:  He was alert during evaluation Oral [facility] exam:  Dentition: He is missing two teeth on the bottom, does not wear top dentures   Labial strength and ROM: mildly reduced   Lingual strength and ROM: mildly reduced   Mandibular strength and ROM: adequate   Volitional cough: fair   Volitional swallow: fair       Items administered:  Puree, soft solid, honey thick liquid, nectar thick liquid, thin liquids, Liquids were taken by straw  Oral stage:  Lip closure: mildly reduced   Mastication: Mildly to moderately decreased   Bolus formation: Mildly reduced with the mechanical soft textures   Bolus control: Decreased   Transfer: Mild delay   Oral residue: Mild to mod residue   Pocketing: not noted     Pharyngeal stage:  Swallow promptness: Mild delay   Laryngeal rise: Fair   Wet voice: Noted with thin and nectar thick liquids   Throat clear: Noted with thin and nectar thick liquids   Cough: Noted with thin and nectar thick liquids   Secondary swallows: No   Audible swallows: No   S/s of aspiration with thin and nectar thick liquids     Esophageal stage:  H/o GERD    Aspiration precautions posted    Results d/w:  Pt, nursing    Goal(s):  Pt will tolerate least restrictive diet w/out s/s aspiration or oral/pharyngeal difficulties

## 2019-03-01 NOTE — ED NOTES
RN in to see pt for bedside report  Pt noted to be awake and able to follow commands  Will contact ICU for further orders       Nicole Arndt RN  03/01/19 2952

## 2019-03-01 NOTE — ED NOTES
Per Brittany GAMINO verbal order to give patient 1L NSS Bolus due to patient's low BP  Also, per Brittany Martinez verbal order to irrigate gao with 60 cc of sterile saline and check for urine return       Catrachita Ozuna RN  03/01/19 8560

## 2019-03-01 NOTE — PLAN OF CARE
Problem: Potential for Falls  Goal: Patient will remain free of falls  Description  INTERVENTIONS:  - Assess patient frequently for physical needs  -  Identify cognitive and physical deficits and behaviors that affect risk of falls    -  [place] fall precautions as indicated by assessment   - Educate patient/family on patient safety including physical limitations  - Instruct patient to call for assistance with activity based on assessment  - Modify environment to reduce risk of injury  - Consider OT/PT consult to assist with strengthening/mobility  Outcome: Progressing     Problem: Prexisting or High Potential for Compromised Skin Integrity  Goal: Skin integrity is maintained or improved  Description  INTERVENTIONS:  - Identify patients at risk for skin breakdown  - Assess and monitor skin integrity  - Assess and monitor nutrition and hydration status  - Monitor labs (i e  albumin)  - Assess for incontinence   - Turn and reposition patient  - Assist with mobility/ambulation  - Relieve pressure over bony prominences  - Avoid friction and shearing  - Provide appropriate hygiene as needed including keeping skin clean and dry  - Evaluate need for skin moisturizer/barrier cream  - Collaborate with interdisciplinary team (i e  Nutrition, Rehabilitation, etc )   - Patient/family teaching  Outcome: Progressing     Problem: PAIN - ADULT  Goal: Verbalizes/displays adequate comfort level or baseline comfort level  Description  Interventions:  - Encourage patient to monitor pain and request assistance  - Assess pain using appropriate pain scale  - Administer analgesics based on type and severity of pain and evaluate response  - Implement non-pharmacological measures as appropriate and evaluate response  - Consider cultural and social influences on pain and pain management  - Notify physician/advanced practitioner if interventions unsuccessful or patient reports new pain  Outcome: Progressing     Problem: INFECTION - ADULT  Goal: Absence or prevention of progression during hospitalization  Description  INTERVENTIONS:  - Assess and monitor for signs and symptoms of infection  - Monitor lab/diagnostic results  - Monitor all insertion sites, i e  indwelling lines, tubes, and drains  - Monitor endotracheal (as able) and nasal secretions for changes in amount and color  - [place] appropriate cooling/warming therapies per order  - Administer medications as ordered  - Instruct and encourage patient and family to use good hand hygiene technique  - Identify and instruct in appropriate isolation precautions for identified infection/condition  Outcome: Progressing     Problem: DISCHARGE PLANNING  Goal: Discharge to home or other facility with appropriate resources  Description  INTERVENTIONS:  - Identify barriers to discharge w/patient and caregiver  - Arrange for needed discharge resources and transportation as appropriate  - Identify discharge learning needs (meds, wound care, etc )  - Arrange for interpretive services to assist at discharge as needed  - Refer to Case Management Department for coordinating discharge planning if the patient needs post-hospital services based on physician/advanced practitioner order or complex needs related to functional status, cognitive ability, or social support system  Outcome: Progressing     Problem: Knowledge Deficit  Goal: Patient/family/caregiver demonstrates understanding of disease process, treatment plan, medications, and discharge instructions  Description  Complete learning assessment and assess knowledge base    Interventions:  - Provide teaching at level of understanding  - Provide teaching via preferred learning methods  Outcome: Progressing     Problem: RESPIRATORY - ADULT  Goal: Achieves optimal ventilation and oxygenation  Description  INTERVENTIONS:  - Assess for changes in respiratory status  - Assess for changes in mentation and behavior  - Position to facilitate oxygenation and minimize respiratory effort  - Oxygen administration by appropriate delivery method based on oxygen saturation (per order) or ABGs  - Initiate smoking cessation education as indicated  - Encourage broncho-pulmonary hygiene including cough, deep breathe, Incentive Spirometry  - Assess the need for suctioning and aspirate as needed  - Assess and instruct to report SOB or any respiratory difficulty  - Respiratory Therapy support as indicated  Outcome: Progressing     Problem: GENITOURINARY - ADULT  Goal: Maintains or returns to baseline urinary function  Description  INTERVENTIONS:  - Assess urinary function  - Encourage oral fluids to ensure adequate hydration  - Administer IV fluids as ordered to ensure adequate hydration  - Administer ordered medications as needed  - Offer frequent toileting  - Follow urinary retention protocol if ordered  Outcome: Progressing  Goal: Absence of urinary retention  Description  INTERVENTIONS:  - Assess patient?s ability to void and empty bladder  - Monitor I/O  - Bladder scan as needed  - Discuss with physician/AP medications to alleviate retention as needed  - Discuss catheterization for long term situations as appropriate  Outcome: Progressing  Goal: Urinary catheter remains patent  Description  INTERVENTIONS:  - Assess patency of urinary catheter  - If patient has a chronic gao, consider changing catheter if non-functioning  - Follow guidelines for intermittent irrigation of non-functioning urinary catheter  Outcome: Progressing

## 2019-03-01 NOTE — ED NOTES
Pt's sister Ahmad Lyon called on prior shift, per RN report  Pt's sister called back this AM and reported to unit clerk that they 2 do not speak  This AM, pt made aware that his sister was called on prior shift, but no one was able to speak with her  Upon receiving news that his sister reported that they do not speak, pt became visibly upset and questioned why she would say something like that  Attempt to call pt's sister, at pt's request, but there was no answer  Pt updated on attempt to contact his sister       Carissa Dotson, SYL  03/01/19 1915

## 2019-03-01 NOTE — ED NOTES
Spoke with ICU, advised to contact respiratory to "wean off the ventilator"  Respiratory at bedside       Rob Church RN  03/01/19 7597

## 2019-03-01 NOTE — PROGRESS NOTES
Progress Note - Critical Care   Neisha Stearns 66 y o  male MRN: [id number]  Unit/Bed#: ED 20 Encounter: [id number]    Assessment/Plan:  1  Acute hypoxic respiratory failure of unclear etiology, suspect pneumonia vs aspiration  · Improved respiratory acidosis  · Continue Mechanical ventilation with plan to wean this am  · Propofol, fentanyl for sedation while on ventilation  · Continue cefepime, flagyl, vancomycin  · Pharmacy consult for vancomycin dosing  · Echo pending for evaluation of heart failure    2  Abnormal chest Ct scan with emphysema and probably multi lobar pneumonia/HCAP  · Continue antibiotics as above  · Follow cultures, procalcitonin    3  Sepsis secondary to suspected pneumonia, possible aspiration  · Plan as above    4  Mild KIMBERLY , now improved    5  Acute metabolic encephalopathy, now improved  · Patient sedated overnight while on vent, however, easily aroused, following commands     6  Atrial fibrillation on anticoagulation as an outpatient  · Will hold eliquis secondary to urethral bleeding  · Will reevaluate today and restart if okay with urology    7  Normocytic anemia with 3 gm drop in hemoglobin overnight  · Will recheck hemoglobin at 1200  · Monitor bleeding from meatus, no active bleeding noted currently    8  Displacement of gao catheter s/p Suprapubic catheter by urology in the setting of history of BPH  · Tip of catheter with balloon lodged in urethra on CT yesterday  · I personally advanced catheter with balloon down into bladder, yellow urine with blood clots returned, balloon was reinflated, however, catheter migrated out of meatus despite balloon inflation with no urine draining from catheter, urology was then consulted and s/p catheter placed  · Urology following    9  Convulsion  · Continue keppra    10  Hypothyroidism  · Continue synthroid    11   COPD, no acute exacerbation  · Atrovent,xopenex nebs ATC  · Pulmicort neb      _____________________________________________________________________    HPI/24hr events:   See note regarding displacement of catheter  Medications:    Current Facility-Administered Medications:  cefepime 2,000 mg Intravenous Q12H Charyl Pacolet, CRNP Last Rate: 2,000 mg (03/01/19 0329)   chlorhexidine 15 mL Swish & Spit Q12H [facility] Jairo Kahn, HALIMANP    fentaNYL 50 mcg/hr Intravenous Continuous Charyl Pacolet, CRNP Last Rate: 50 mcg/hr (02/28/19 1422)   gabapentin 100 mg Oral BID Nikki K Wilderofskcristopher, CRNP    insulin lispro 1-6 Units Subcutaneous Q6H [facility] Nikki K Wilderofhoracio, CRNP    levETIRAcetam 500 mg Intravenous Q12H [facility] Orval Best, CRNP Last Rate: Stopped (02/28/19 2220)   levothyroxine 200 mcg Oral Early Morning Nikki K Bilofsky, CRNP    metroNIDAZOLE 500 mg Intravenous Q8H Nikki K Bilofsky, CRNP Last Rate: Stopped (03/01/19 0335)   propofol 5 mcg/kg/min Intravenous Titrated Sherice Stapleton DO Last Rate: 5 mcg/kg/min (03/01/19 1398)   vancomycin 15 mg/kg Intravenous Q12H Nikki K Bilofsky, CRNP          fentaNYL 50 mcg/hr Last Rate: 50 mcg/hr (02/28/19 1422)   propofol 5 mcg/kg/min Last Rate: 5 mcg/kg/min (03/01/19 1021)         Physical exam:  Vitals: There is no height or weight on file to calculate BMI  Blood pressure 101/52, pulse 72, temperature 99 °F (37 2 °C), temperature source Probe, resp  rate 16, weight 114 kg (252 lb 3 3 oz), SpO2 97 %  ,  Temp  Min: 96 3 °F (35 7 °C)  Max: 99 7 °F (37 6 °C)  IBW: -88 kg    SpO2: 97 %  SpO2 Activity: At Rest  O2 Device: (Ventilator)      Intake/Output Summary (Last 24 hours) at 3/1/2019 0402  Last data filed at 2/28/2019 2220  Gross per 24 hour   Intake 1750 ml   Output 10 ml   Net 1740 ml       Invasive/non-invasive ventilation settings:   Respiratory    Lab Data (Last 4 hours)    None         O2/Vent Data (Last 4 hours)      03/01 0115 03/01 0247         Vent Mode AC/VC AC/VC      Resp Rate (BPM) (BPM) 20 20      Vt (mL) (mL) 400 400      FIO2 (%) (%) 50 50      PEEP (cmH2O) (cmH2O) 5 2 5      MV  8 41                Invasive Devices     Peripheral Intravenous Line            Peripheral IV 02/28/19 Left Hand 1 day    Peripheral IV 02/28/19 Left Wrist less than 1 day    Peripheral IV 02/28/19 Right Antecubital less than 1 day    Peripheral IV 02/28/19 Right Hand less than 1 day          Drain            NG/OG/Enteral Tube Orogastric 16 Fr Center mouth less than 1 day    Urethral Catheter Temperature probe 16 Fr  less than 1 day          Airway            ETT  8 mm 1 day                  Physical Exam:  Gen: sedated, intubated, follows commands  HEENT: normocephalic, atraumatic, oropharynx clear  Neck: no JVD, no lymphadenopathy, trachea midline  Chest: lung sounds diminished, equal  Cor: distant heart sounds, +1-2 pitting edema of BLEs  Abd: obese, soft, non tender, non distended  Ext: moves all extremities  Neuro: arousable, follows commands  Skin: warm, dry      Diagnostic Data:  Lab: I have personally reviewed pertinent lab results     CBC:   Results from last 7 days   Lab Units 02/28/19  1346 02/28/19  1340   WBC Thousand/uL  --  14 52*   HEMOGLOBIN g/dL  --  11 4*   I STAT HEMOGLOBIN g/dl 12 6  --    HEMATOCRIT %  --  37 2   HEMATOCRIT, ISTAT % 37  --    PLATELETS Thousands/uL  --  199       CMP:   Results from last 7 days   Lab Units 02/28/19  1346 02/28/19  1340   SODIUM mmol/L  --  136   POTASSIUM mmol/L  --  4 3   CHLORIDE mmol/L  --  97*   CO2 mmol/L  --  32   CO2, I-STAT mmol/L 31  --    BUN mg/dL  --  23   CREATININE mg/dL  --  1 27   CALCIUM mg/dL  --  8 6   ALK PHOS U/L  --  54   ALT U/L  --  18   AST U/L  --  14   GLUCOSE, ISTAT mg/dl 182*  --      PT/INR:   Lab Results   Component Value Date    INR 1 48 (H) 02/28/2019   ,   Magnesium:   Results from last 7 days   Lab Units 02/28/19  1340   MAGNESIUM mg/dL 1 8     Phosphorous:       Microbiology:  Cultures pending        Imaging:  No new    Cardiac lab/EKG/telemetry/ECHO:   Atrial fibrillation    VTE Prophylaxis: SCD    Code Status: Level 2 - DNAR: but accepts endotracheal intubation    STEVENSON Jay    Portions of the record may have been created with voice recognition software  Occasional wrong word or "sound a like" substitutions may have occurred due to the inherent limitations of voice recognition software  Read the chart carefully and recognize, using context, where substitutions have occurred

## 2019-03-01 NOTE — ED NOTES
Pt's NP, Zackery Ivey, from [facility] called in to speak with RN  NP questioned if a POLST was received for this pt  A confirmation was given to NP that a copy of the POLST was received via fax, not the original  A copy of the fax received was faxed to Zackery Ivey at [phone]        Sabina Griffith RN  03/01/19 6225

## 2019-03-01 NOTE — ED NOTES
Charge RN spoke with Christine Roblero, who reports that ICU will be down to reevaluate pt in ED        Bean Murillo RN  03/01/19 6108

## 2019-03-01 NOTE — ED NOTES
Severa Rumps, NP at bedside with ICU doctor irrigating pt's catheter at this time       Amirah Nath RN  02/28/19 1330

## 2019-03-01 NOTE — ED NOTES
Venodyne calf pumps applied at this time  Patient repositioned, cleaned and barrier cream applied to sacrum/buttocks  Patient tolerated well        Precious Light RN  03/01/19 1882

## 2019-03-01 NOTE — ED NOTES
Irrigated patient's gao catheter with 20 cc of NSS and got 0 cc return back  Tyesha Nolan made aware       Herchel Dance, RN  03/01/19 8511

## 2019-03-01 NOTE — PLAN OF CARE
Problem: Potential for Falls  Goal: Patient will remain free of falls  Description  INTERVENTIONS:  - Assess patient frequently for physical needs  -  Identify cognitive and physical deficits and behaviors that affect risk of falls    -  [place] fall precautions as indicated by assessment   - Educate patient/family on patient safety including physical limitations  - Instruct patient to call for assistance with activity based on assessment  - Modify environment to reduce risk of injury  - Consider OT/PT consult to assist with strengthening/mobility  Outcome: Progressing     Problem: Prexisting or High Potential for Compromised Skin Integrity  Goal: Skin integrity is maintained or improved  Description  INTERVENTIONS:  - Identify patients at risk for skin breakdown  - Assess and monitor skin integrity  - Assess and monitor nutrition and hydration status  - Monitor labs (i e  albumin)  - Assess for incontinence   - Turn and reposition patient  - Assist with mobility/ambulation  - Relieve pressure over bony prominences  - Avoid friction and shearing  - Provide appropriate hygiene as needed including keeping skin clean and dry  - Evaluate need for skin moisturizer/barrier cream  - Collaborate with interdisciplinary team (i e  Nutrition, Rehabilitation, etc )   - Patient/family teaching  Outcome: Progressing     Problem: PAIN - ADULT  Goal: Verbalizes/displays adequate comfort level or baseline comfort level  Description  Interventions:  - Encourage patient to monitor pain and request assistance  - Assess pain using appropriate pain scale  - Administer analgesics based on type and severity of pain and evaluate response  - Implement non-pharmacological measures as appropriate and evaluate response  - Consider cultural and social influences on pain and pain management  - Notify physician/advanced practitioner if interventions unsuccessful or patient reports new pain  Outcome: Progressing     Problem: INFECTION - ADULT  Goal: Absence or prevention of progression during hospitalization  Description  INTERVENTIONS:  - Assess and monitor for signs and symptoms of infection  - Monitor lab/diagnostic results  - Monitor all insertion sites, i e  indwelling lines, tubes, and drains  - Monitor endotracheal (as able) and nasal secretions for changes in amount and color  - [place] appropriate cooling/warming therapies per order  - Administer medications as ordered  - Instruct and encourage patient and family to use good hand hygiene technique  - Identify and instruct in appropriate isolation precautions for identified infection/condition  Outcome: Progressing     Problem: RESPIRATORY - ADULT  Goal: Achieves optimal ventilation and oxygenation  Description  INTERVENTIONS:  - Assess for changes in respiratory status  - Assess for changes in mentation and behavior  - Position to facilitate oxygenation and minimize respiratory effort  - Oxygen administration by appropriate delivery method based on oxygen saturation (per order) or ABGs  - Initiate smoking cessation education as indicated  - Encourage broncho-pulmonary hygiene including cough, deep breathe, Incentive Spirometry  - Assess the need for suctioning and aspirate as needed  - Assess and instruct to report SOB or any respiratory difficulty  - Respiratory Therapy support as indicated  Outcome: Progressing     Problem: GENITOURINARY - ADULT  Goal: Absence of urinary retention  Description  INTERVENTIONS:  - Assess patient?s ability to void and empty bladder  - Monitor I/O  - Bladder scan as needed  - Discuss with physician/AP medications to alleviate retention as needed  - Discuss catheterization for long term situations as appropriate  Outcome: Progressing  Goal: Urinary catheter remains patent  Description  INTERVENTIONS:  - Assess patency of urinary catheter  - If patient has a chronic gao, consider changing catheter if non-functioning  - Follow guidelines for intermittent irrigation of non-functioning urinary catheter  Outcome: Progressing     Problem: DISCHARGE PLANNING  Goal: Discharge to home or other facility with appropriate resources  Description  INTERVENTIONS:  - Identify barriers to discharge w/patient and caregiver  - Arrange for needed discharge resources and transportation as appropriate  - Identify discharge learning needs (meds, wound care, etc )  - Arrange for interpretive services to assist at discharge as needed  - Refer to Case Management Department for coordinating discharge planning if the patient needs post-hospital services based on physician/advanced practitioner order or complex needs related to functional status, cognitive ability, or social support system  Outcome: Not Progressing     Problem: Knowledge Deficit  Goal: Patient/family/caregiver demonstrates understanding of disease process, treatment plan, medications, and discharge instructions  Description  Complete learning assessment and assess knowledge base    Interventions:  - Provide teaching at level of understanding  - Provide teaching via preferred learning methods  Outcome: Not Progressing     Problem: GENITOURINARY - ADULT  Goal: Maintains or returns to baseline urinary function  Description  INTERVENTIONS:  - Assess urinary function  - Encourage oral fluids to ensure adequate hydration  - Administer IV fluids as ordered to ensure adequate hydration  - Administer ordered medications as needed  - Offer frequent toileting  - Follow urinary retention protocol if ordered  Outcome: Not Progressing

## 2019-03-01 NOTE — ED NOTES
Soft restraints placed on patient as patient found attempting to pull out ETT       Nell Zeng RN  03/01/19 6387

## 2019-03-01 NOTE — UTILIZATION REVIEW
Initial Clinical Review    Admission: Date/Time/Statement: 2/28/19 @ 1609 Inpatient Written   Orders Placed This Encounter   Procedures    Inpatient Admission     Standing Status:   Standing     Number of Occurrences:   1     Order Specific Question:   Admitting Physician     Answer:   Griselda May [[id number]]     Order Specific Question:   Level of Care     Answer:   Med Surg [[id number]]     Order Specific Question:   Estimated length of stay     Answer:   More than 2 Midnights     Order Specific Question:   Certification     Answer:   I certify that inpatient services are medically necessary for this patient for a duration of greater than two midnights  See H&P and MD Progress Notes for additional information about the patient's course of treatment  ED: Date/Time/Mode of Arrival:   ED Arrival Information     Expected Arrival Acuity Means of Arrival Escorted By Service Admission Type    2/28/2019 2/28/2019 13:32 Immediate Ambulance [facility]    Arrival Complaint    Found Unresponsive/HTN/Respiratory [address]        Chief Complaint:   Chief Complaint   Patient presents with    Respiratory Arrest     SOB from [facility], then went unresponsive  pt intubated on arrival      History of Illness: 66 y o  M w/ h/o COPD, CHF, afib, seizures, schzioaffective disorder p/w unresponsiveness/resp distress  Per EMS, pt was complaining of resp distress and went unresponsive  Pt was intubated in the field without sedation (no gag per EMS)  Pt given 2mg versed after intubation      ED Vital Signs:   ED Triage Vitals   Temperature Pulse Respirations Blood Pressure SpO2   02/28/19 1447 02/28/19 1336 02/28/19 1349 02/28/19 1336 02/28/19 1336   (!) 96 3 °F (35 7 °C) (!) 108 12 105/54 96 %      Temp Source Heart Rate Source Patient Position - Orthostatic VS BP Location FiO2 (%)   02/28/19 1447 02/28/19 1541 02/28/19 1349 02/28/19 1349 --   Probe Monitor Lying Left arm       Pain Score       03/01/19 0422       No Pain        Wt Readings from Last 1 Encounters:   03/01/19 115 kg (253 lb 8 5 oz)     Vital Signs (abnormal): RESPS 10-30, BP 80/46, 89/51, 88/51,  PT INTUBATED  Pertinent Labs/Diagnostic Test Results: WBC 14 52, HGB 11 4, CL 97, GLUC 182, PT 18 0, INR 1 48, TSH 0 310  CXR:  1  Endotracheal tube tip 3 8 cm above the irma  2  Bibasilar right greater than left airspace disease concerning for pneumonia  Mild shifting of the cardiac silhouette toward the right could be related to atelectasis/right lung volume loss  CT HEAD:  No acute intracranial abnormality  CT AP:  Right middle lobe consolidation with air bronchograms which may represent bronchopneumonia  However, there is partial opacification of the proximal right middle lobe bronchus which may represent a mucous plug or endobronchial lesion  Scattered bilateral tree-in-bud opacities suspicious for an infectious or inflammatory bronchiolitis  No acute intra-abdominal abnormality  No free air or free fluid  Guzman catheter with the balloon portion located within the membranous urethra  Consider repositioning  Moderate amount of stool noted throughout the colon  Mild right hilar lymphadenopathy likely reactive in nature    EKG:    ED Treatment:   Medication Administration from 02/28/2019 1330 to 03/01/2019 1218       Date/Time Order Dose Route Action     02/28/2019 1422 fentaNYL 1250 mcg in sodium chloride 0 9% 125mL drip 50 mcg/hr Intravenous New Bag     02/28/2019 1352 sodium chloride 0 9 % bolus 1,000 mL 1,000 mL Intravenous New Bag     02/28/2019 1536 propofol (DIPRIVAN) 1000 mg in 100 mL infusion (premix) 5 mcg/kg/min Intravenous New Bag     02/28/2019 1500 propofol (DIPRIVAN) 1000 mg in 100 mL infusion (premix) 5 mcg/kg/min Intravenous New Bag     02/28/2019 1537 cefepime (MAXIPIME) 2 g/50 mL dextrose IVPB 2,000 mg Intravenous New Bag     02/28/2019 1524 iohexol (OMNIPAQUE) 350 MG/ML injection (MULTI-DOSE) 100 mL 100 mL Intravenous Given     02/28/2019 1552 albuterol inhalation solution 10 mg 10 mg Nebulization Given     02/28/2019 1552 ipratropium (ATROVENT) 0 02 % inhalation solution 1 mg 1 mg Nebulization Given     02/28/2019 1552 sodium chloride 0 9 % inhalation solution 3 mL 9 mL Nebulization Given     02/28/2019 1655 vancomycin (VANCOCIN) 1,750 mg in sodium chloride 0 9 % 500 mL IVPB 1,750 mg Intravenous New Bag     02/28/2019 1617 metroNIDAZOLE (FLAGYL) IVPB (premix) 500 mg 500 mg Intravenous New Bag     03/01/2019 0329 cefepime (MAXIPIME) 2 g/50 mL dextrose IVPB 2,000 mg Intravenous New Bag     03/01/2019 0527 vancomycin (VANCOCIN) 1,750 mg in sodium chloride 0 9 % 500 mL IVPB 1,750 mg Intravenous New Bag     03/01/2019 0844 metroNIDAZOLE (FLAGYL) IVPB (premix) 500 mg 500 mg Intravenous New Bag     03/01/2019 0025 metroNIDAZOLE (FLAGYL) IVPB (premix) 500 mg 500 mg Intravenous New Bag     03/01/2019 1056 levETIRAcetam (KEPPRA) 500 mg in sodium chloride 0 9 % 100 mL IVPB 500 mg Intravenous New Bag     02/28/2019 2145 levETIRAcetam (KEPPRA) 500 mg in sodium chloride 0 9 % 100 mL IVPB 500 mg Intravenous New Bag     03/01/2019 0337 propofol (DIPRIVAN) 1000 mg in 100 mL infusion (premix) 5 mcg/kg/min Intravenous New Bag     03/01/2019 0413 fentanyl citrate (PF) 100 MCG/2ML 50 mcg 50 mcg Intravenous Given     03/01/2019 1105 methylPREDNISolone sodium succinate (Solu-MEDROL) injection 40 mg 40 mg Intravenous Given        Past Medical/Surgical History:    Active Ambulatory Problems     Diagnosis Date Noted    No Active Ambulatory Problems     Past Medical History:   Diagnosis Date    Acute respiratory failure with hypoxia (HCC)     Anemia     Atrial fibrillation (HCC)     Benign prostatic hyperplasia without lower urinary tract symptoms     Cardiac disease     Convulsion (HCC)     COPD (chronic obstructive pulmonary disease) (HCC)     Dementia     Disease of thyroid gland     Encephalopathy     GERD (gastroesophageal reflux disease)  Heart failure (HCC)     Hyperlipidemia     Hypo-osmolality and hyponatremia     Hypothyroidism     Peripheral vascular disease ([place] [address] )     Psychiatric disorder     Pulmonary collapse      Admitting Diagnosis: Acute respiratory failure ([place] [address] ) [J96 00]  Urinary retention [R33 9]  Pneumonia [J18 9]  Unresponsive [R41 89]  Sepsis ([place] [address] ) [A41 9]  Displacement of Guzman catheter ([place] [address] ) [T83 021A]  Age/Sex: 66 y o  male  Assessment/Plan: 67 y/o male presented to ED via ambulance after being found unresponsive and acute hypoxic respiratory failure  Pt intubated en route without sedation or paralytics  Patient was slightly hypotensive  He received a 1 L fluid bolus in the emergency department  Sepsis was suspected the patient was started on broad-spectrum antibiotics including vancomycin, cefepime and Flagyl for concern of aspiration  Patient had a CT PE study including abdomen and pelvis which was negative for PE  He was found to have a right middle lobe consolidation with possible mucus plug or endobronchial lesion  Patient also with multifocal patchy consolidations in the right lower lobe that could be suspicious for aspiration pneumonia  Patient was started on propofol as well as fentanyl in the emergency department for sedation and analgesia  Patient was also found to have signs concerning for urethral Guzman balloon inflation  Admit INPATIENT status:  IV ABX, bronchoscopy, cxs, monitor hypotension consider vasoactive meds, labs, duoneb txs,   Continue home meds      Admission Orders:  Intubation with mechanical ventilation  Telemetry   NPO, Dysphagia assessment prior to starting diet  Fall precautions  OOB with assist  ET tube suction  Accuchecks QAC and QHS with coverage  OG tube  Daily weights, I/O  Neuro checks q4h  Echo  Blood and sputum cxs  Consult nutr ser, cm  Sequential compression device   Scheduled Meds:   Current Facility-Administered Medications:  albuterol 2 5 mg Nebulization Q6H PRN budesonide 0 5 mg Nebulization Q12H   cefepime 2,000 mg Intravenous Q12H   chlorhexidine 15 mL Swish & Spit Q12H [facility]   diltiazem 120 mg Oral Daily   gabapentin 100 mg Oral BID   insulin lispro 1-6 Units Subcutaneous Q6H [facility]   ipratropium 0 5 mg Nebulization TID   levalbuterol 1 25 mg Nebulization TID   levETIRAcetam 500 mg Intravenous Q12H [facility]   levothyroxine 200 mcg Oral Early Morning   methylPREDNISolone sodium succinate 40 mg Intravenous Q8H [facility]   metroNIDAZOLE 500 mg Intravenous Q8H   vancomycin 15 mg/kg Intravenous Q12H     Continuous Infusions:    PRN Meds:   albuterol    Network Utilization Review Department  Phone: [phone]; Fax [phone]  [email or web address]  ATTENTION: Please call with any questions or concerns to [phone]  and carefully listen to the prompts so that you are directed to the right person  Send all requests for admission clinical reviews, approved or denied determinations and any other requests to fax [phone]   All voicemails are confidential

## 2019-03-01 NOTE — PROGRESS NOTES
Suprapubic Aspiration  Date/Time: 3/1/2019 4:41 AM  Performed by: Kendall Palomares MD  Authorized by: Kendall Palomares MD     Patient location:  ED and bedside  Consent:     Consent obtained:  Emergent situation (attempted telephone contact with patient's sister Dennis Liang  Could not reach her  Procedure deemed urgent )  Universal protocol:     Immediately prior to the procedure a time out was called: yes      Patient identity confirmed:  Arm band  Post-procedure details:     Patient tolerance of procedure: Tolerated well, no immediate complications      The patient intubated and sedated  Numerous attempts at Guzman catheterization were unsuccessful  Filiforms and followers was also unsuccessful due to the false passage  Decision was made to proceed with suprapubic aspiration at the bedside and tube placement  Consent could not be obtained by family member  This was verified by the nursing staff  The abdomen was prepped and draped in sterile fashion  Ultrasound was utilized he to locate the full bladder  Patient was placed in steep Trendelenburg position  A small stab wound was made 2 fingerbreadths above the symphysis pubis in the midline  A spinal needle was utilized as finer with ultrasound guidance and passed easily into the bladder with clear yellow urine aspirated  The 12 [place] Stamey suprapubic catheter was then placed although ultrasound guidance as well with clear yellow urine return  This was sutured into place and secured  The procedure was tolerated well

## 2019-03-01 NOTE — ED NOTES
Urology at patient's bedside to place new urinary gao catheter       Timothy Saleh RN  03/01/19 8565

## 2019-03-01 NOTE — ASSESSMENT & PLAN NOTE
Status post bedside attempts at Guzman catheter placement, unsuccessful and subsequent successful placement of a 12 Bengali suprapubic catheter in conjunction with Dr Daryl Villela

## 2019-03-01 NOTE — CONSULTS
Consult - Urology   Josh Swan 1940, 66 y o  male MRN: [id number]    Unit/Bed#: ED 20 Encounter: [id number]    Displacement of Guzman catheter [facility])  Assessment & Plan  Status post bedside attempts at Guzman catheter placement, unsuccessful and subsequent successful placement of a 12 Filipino suprapubic catheter in conjunction with Dr Kira Moreno     Bedside rounds performed with RN  Seen and procedure performed in conjunction with Dr Kira Moreno     Subjective/Objective     Subjective:   History largely absent secondary to minimal records and patient being sedated intubated the time of consultation  Attempt being by Dr Kira Moreno to contact the patient's sister for consent regarding bedside suprapubic catheter  Sister unavailable  Patient is currently admitted to the Critical Care service for hospital-acquired pneumonia requiring intubation for acute respiratory failure  Discussed with Amilcar Jordan in the critical care unit  Guzman was found to be inflated in the urethra, balloon was deflated, Guzman was advanced in urine was obtained, but then Guzman catheter  Urine then stopped draining and Guzman catheter pulled out of the penis a significant amount  Urologic consultation requested in light of malposition Guzman catheter an inability to place new Guzman  No document urologic history  Patient does take Eliquis anticoagulation  Review of Systems   Unable to perform ROS: Acuity of condition       Objective:  Vitals: Blood pressure 91/50, pulse 68, temperature 99 °F (37 2 °C), temperature source Probe, resp  rate 12, weight 114 kg (252 lb 3 3 oz), SpO2 97 %  ,There is no height or weight on file to calculate BMI        Intake/Output Summary (Last 24 hours) at 3/1/2019 0535  Last data filed at 2/28/2019 2220  Gross per 24 hour   Intake 1750 ml   Output 10 ml   Net 1740 ml       Invasive Devices     Peripheral Intravenous Line            Peripheral IV 02/28/19 Left Hand 1 day    Peripheral IV 02/28/19 Left Wrist less than 1 day    Peripheral IV 02/28/19 Right Antecubital less than 1 day    Peripheral IV 02/28/19 Right Hand less than 1 day          Drain            NG/OG/Enteral Tube Orogastric 16 Fr Center mouth less than 1 day    Suprapubic Catheter 16 Fr  less than 1 day    Urethral Catheter Temperature probe 16 Fr  less than 1 day          Airway            ETT  8 mm 1 day                Physical Exam   Constitutional: He is oriented to person, place, and time  He is cooperative  He does not appear ill  No distress  Patient is sedated intubated in the emergency department  Unable to provide history or answer questions  HENT:   Head: Normocephalic and atraumatic  Moist mucous membranes  Eyes: Conjunctivae and EOM are normal    Neck: Normal range of motion  Neck supple  No tracheal deviation present  Pulmonary/Chest: No respiratory distress  He has no wheezes  Mechanical ventilator   Abdominal: Soft  Bowel sounds are normal  He exhibits no distension and no mass  There is no tenderness  Abdomen obese, soft, nontender  Two fingerbreadth reducible umbilical hernia which appears to be fat containing on CT scan  Paramedian incision in the right lower quadrant possible appendectomy in the past    Genitourinary:   Genitourinary Comments: Uncircumcised penis  See procedure note for attempts at Guzman catheter placement and subsequent placement of suprapubic catheter with Dr Hector Franks    Musculoskeletal: Normal range of motion  He exhibits no edema  Neurological: He is alert and oriented to person, place, and time  Skin: Skin is warm and dry  No rash noted  He is not diaphoretic  No erythema  No pallor  Psychiatric: He has a normal mood and affect  His behavior is normal  Judgment and thought content normal    Nursing note and vitals reviewed        History:    Past Medical History:   Diagnosis Date    Acute respiratory failure with hypoxia (HCC)     Anemia     Atrial fibrillation ([address] )     Benign prostatic hyperplasia without lower urinary tract symptoms     Cardiac disease     Convulsion (HCC)     COPD (chronic obstructive pulmonary disease) (HCC)     Dementia     Disease of thyroid gland     Encephalopathy     GERD (gastroesophageal reflux disease)     Heart failure (HCC)     Hyperlipidemia     Hypo-osmolality and hyponatremia     Hypothyroidism     Peripheral vascular disease [facility]     Psychiatric disorder     schizoaffective    Pulmonary collapse      No past surgical history on file  No family history on file    Social History     Socioeconomic History    Marital status: Single     Spouse name: Not on file    Number of children: Not on file    Years of education: Not on file    Highest education level: Not on file   Occupational History    Not on file   Social Needs    Financial resource strain: Not on file    Food insecurity:     Worry: Not on file     Inability: Not on file    Transportation needs:     Medical: Not on file     Non-medical: Not on file   Tobacco Use    Smoking status: Former Smoker   Substance and Sexual Activity    Alcohol use: No    Drug use: No    Sexual activity: Not on file   Lifestyle    Physical activity:     Days per week: Not on file     Minutes per session: Not on file    Stress: Not on file   Relationships    Social connections:     Talks on phone: Not on file     Gets together: Not on file     Attends [employer] service: Not on file     Active member of club or organization: Not on file     Attends meetings of clubs or organizations: Not on file     Relationship status: Not on file    Intimate partner violence:     Fear of current or ex partner: Not on file     Emotionally abused: Not on file     Physically abused: Not on file     Forced sexual activity: Not on file   Other Topics Concern    Not on file   Social History Narrative    Not on file       Imaging:  CT of the abdomen from 2/20/2019 shows urinary bladder collapsed, Guzman catheter with the balloon located in the membranous urethra  Imaging reviewed - both report and images personally reviewed  Labs:  Recent Labs     02/28/19  1340   WBC 14 52*     Recent Labs     02/28/19  1340 02/28/19  1346   HGB 11 4* 12 6       Recent Labs     02/28/19  1340   CREATININE 1 27       Microbiology:  Urinalysis with large blood      Carmen Salgado PA-C  Date: 3/1/2019 Time: 5:35 AM

## 2019-03-01 NOTE — ED NOTES
TIME OUT: suprapubic cath, Dr Rosetta Boeck and Lidia Downs  Per Dr Lidia Downs, unable to contact family for suprapubic consent as patient is intubated/sedated and states "we are going to proceed with emergent suprapubic insertion"        Parrish Mcallister RN  03/01/19 9114

## 2019-03-01 NOTE — PROGRESS NOTES
Progress Note - ICU Transfer to SD/MS tele/MS   Alejandra Pena 66 y o  male MRN: [id number]  N[address]   Unit/Bed#: ED 20 Encounter: [id number]    Code Status: Level 2 - DNAR: but accepts endotracheal intubation  POA:    Referring Physician: Dr Phong Bridges  Accepting Physician: Dr Dr Cristian Brasher  _____________________________________________________________________    Reason for ICU/SD admission:  Acute hypoxic respiratory failure requiring intubation    History of Present Illness:  Patient was found unresponsive in the skilled nursing facility  EMS was activated the patient was intubated in the field and transported to [facility] Emergency Department  A Guzman catheter was attempted several times causing trauma to the patient's prostate  Urology was consulted and spent 2 hours attempting to place a Guzman catheter but was unable do so  Patient required suprapubic placement of a catheter  The patient remained emergency department overnight is there were no beds in the intensive care unit  This morning the patient weaned well on the vent and he was extubated successfully  The patient is hemodynamically stable at this time and maintaining a adequate saturation of peripheral oxygen via nasal cannula  He will be transferred to medicine this morning      Summary of clinical course:  See above     Consultants:  Urology  _____________________________________________________________________    Diagnostic Data:  CBC:  Results from last 7 days   Lab Units 03/01/19  0527   WBC Thousand/uL 8 40   HEMOGLOBIN g/dL 8 7*   HEMATOCRIT % 27 9*   PLATELETS Thousands/uL 143*      CMP:   Lab Results   Component Value Date    SODIUM 138 03/01/2019    K 3 9 03/01/2019     03/01/2019    CO2 29 03/01/2019    CO2 31 02/28/2019    BUN 22 03/01/2019    CREATININE 0 96 03/01/2019    GLUCOSE 182 (H) 02/28/2019    CALCIUM 8 0 (L) 03/01/2019    AST 14 02/28/2019    ALT 18 02/28/2019    ALKPHOS 54 02/28/2019    EGFR 75 03/01/2019    EGFR 72 02/28/2019   ,   PT/INR:   Lab Results   Component Value Date    INR 1 48 (H) 02/28/2019   ,   Magnesium: No components found for: MAG,  Phosphorous:   Lab Results   Component Value Date    PHOS 2 2 (L) 03/01/2019       ABG:   Lab Results   Component Value Date    PHART 7 398 03/01/2019    LOU0XEL 41 8 03/01/2019    PO2ART 103 8 03/01/2019    DIF4FGG 25 2 03/01/2019    BEART 0 3 03/01/2019    SOURCE Radial, Right 03/01/2019   ,     Microbiology:  Results from last 7 days   Lab Units 02/28/19  1932   INFLUENZA B PCR  Not Detected   RSV PCR  Not Detected       Imaging: I have personally reviewed the pertinent imaging studies on the PACS system  Chest x-ray done 28 February 2019:  1  Endotracheal tube tip 3 8 cm above the irma  2  Bibasilar right greater than left airspace disease concerning for pneumonia  Mild shifting of the cardiac silhouette toward the right could be related to atelectasis/right lung volume loss  CT of the chest abdomen pelvis:   Right middle lobe consolidation with air bronchograms which may represent bronchopneumonia  However, there is partial opacification of the proximal right middle lobe bronchus which may represent a mucous plug or endobronchial lesion  Bronchoscopy is   suggested for further evaluation  Multifocal patchy airspace consolidations are also noted within the right lower lobe suspicious for bronchopneumonia, possibly aspiration pneumonia  A repeat CT chest in 6-8 weeks line treatment is recommended to assess for improvement/resolution and exclude an underlying lesion      Scattered bilateral tree-in-bud opacities suspicious for an infectious or inflammatory bronchiolitis      No acute intra-abdominal abnormality  No free air or free fluid      Guzman catheter with the balloon portion located within the membranous urethra    Consider repositioning      Moderate amount of stool noted throughout the colon      Mild right hilar lymphadenopathy likely reactive in nature  CT of the head:    No acute intracranial abnormality    Cardiac/EKG/telemetry/Echo:   Results from last 7 days   Lab Units 02/28/19  1340   TROPONIN I ng/mL <0 02   NT-PRO BNP pg/mL 415     Sinus tachycardia  _____________________________________________________________________    Recent or scheduled procedures:  Intubation/extubation    Outstanding/pending diagnostics:  None     Mobilization Plan: Mobilize    Home medications that are not reordered and reason why:     Spoke with Dr Uriel Lnog  regarding transfer  Please call [phone] with any questions or concerns  Portions of the record may have been created with voice recognition software  Occasional wrong word or "sound a like" substitutions may have occurred due to the inherent limitations of voice recognition software  Read the chart carefully and recognize, using context, where substitutions have occurred      STEVENSON Cardenas

## 2019-03-01 NOTE — ED NOTES
Per Octavia Lucia from [address] [place] [address], patient to maintain RASS at -2 "for the suprapubic procedure and then back to -1 after"        Estuardo Humphries, RN  03/01/19 4655

## 2019-03-02 ENCOUNTER — APPOINTMENT (INPATIENT)
Dept: RADIOLOGY | Facility: HOSPITAL | Age: 79
DRG: 871 | End: 2019-03-02
Payer: COMMERCIAL

## 2019-03-02 ENCOUNTER — TELEPHONE (OUTPATIENT)
Dept: UROLOGY | Facility: CLINIC | Age: 79
End: 2019-03-02

## 2019-03-02 DIAGNOSIS — Z87.448 PERSONAL HISTORY OF URETHRAL STRICTURE: ICD-10-CM

## 2019-03-02 DIAGNOSIS — Z93.59 SUPRAPUBIC CATHETER (HCC): Primary | ICD-10-CM

## 2019-03-02 LAB
GLUCOSE SERPL-MCNC: 124 MG/DL (ref 65–140)
GLUCOSE SERPL-MCNC: 144 MG/DL (ref 65–140)
GLUCOSE SERPL-MCNC: 146 MG/DL (ref 65–140)
GLUCOSE SERPL-MCNC: 151 MG/DL (ref 65–140)
GLUCOSE SERPL-MCNC: 229 MG/DL (ref 65–140)
LEVETIRACETAM SERPL-MCNC: 19.7 UG/ML (ref 10–40)

## 2019-03-02 PROCEDURE — 82948 REAGENT STRIP/BLOOD GLUCOSE: CPT

## 2019-03-02 PROCEDURE — 99232 SBSQ HOSP IP/OBS MODERATE 35: CPT | Performed by: INTERNAL MEDICINE

## 2019-03-02 PROCEDURE — 99233 SBSQ HOSP IP/OBS HIGH 50: CPT | Performed by: INTERNAL MEDICINE

## 2019-03-02 PROCEDURE — 94640 AIRWAY INHALATION TREATMENT: CPT

## 2019-03-02 PROCEDURE — 87040 BLOOD CULTURE FOR BACTERIA: CPT | Performed by: INTERNAL MEDICINE

## 2019-03-02 PROCEDURE — 73620 X-RAY EXAM OF FOOT: CPT

## 2019-03-02 PROCEDURE — 94760 N-INVAS EAR/PLS OXIMETRY 1: CPT

## 2019-03-02 PROCEDURE — 99232 SBSQ HOSP IP/OBS MODERATE 35: CPT | Performed by: UROLOGY

## 2019-03-02 RX ORDER — PREDNISONE 20 MG/1
40 TABLET ORAL DAILY
Status: DISCONTINUED | OUTPATIENT
Start: 2019-03-03 | End: 2019-03-05

## 2019-03-02 RX ORDER — LEVOFLOXACIN 750 MG/1
750 TABLET ORAL EVERY 24 HOURS
Status: DISCONTINUED | OUTPATIENT
Start: 2019-03-02 | End: 2019-03-02

## 2019-03-02 RX ORDER — METHYLPREDNISOLONE SODIUM SUCCINATE 40 MG/ML
40 INJECTION, POWDER, LYOPHILIZED, FOR SOLUTION INTRAMUSCULAR; INTRAVENOUS EVERY 12 HOURS SCHEDULED
Status: COMPLETED | OUTPATIENT
Start: 2019-03-02 | End: 2019-03-02

## 2019-03-02 RX ORDER — LEVETIRACETAM 500 MG/1
500 TABLET ORAL EVERY 12 HOURS SCHEDULED
Status: DISCONTINUED | OUTPATIENT
Start: 2019-03-02 | End: 2019-03-05 | Stop reason: HOSPADM

## 2019-03-02 RX ADMIN — DILTIAZEM HYDROCHLORIDE 120 MG: 120 CAPSULE, COATED, EXTENDED RELEASE ORAL at 08:35

## 2019-03-02 RX ADMIN — LEVOFLOXACIN 750 MG: 750 TABLET, FILM COATED ORAL at 13:30

## 2019-03-02 RX ADMIN — IPRATROPIUM BROMIDE 0.5 MG: 0.5 SOLUTION RESPIRATORY (INHALATION) at 14:01

## 2019-03-02 RX ADMIN — LEVOTHYROXINE SODIUM 200 MCG: 100 TABLET ORAL at 06:19

## 2019-03-02 RX ADMIN — TRAMADOL HYDROCHLORIDE 50 MG: 50 TABLET, COATED ORAL at 17:14

## 2019-03-02 RX ADMIN — METHYLPREDNISOLONE SODIUM SUCCINATE 40 MG: 40 INJECTION, POWDER, FOR SOLUTION INTRAMUSCULAR; INTRAVENOUS at 06:19

## 2019-03-02 RX ADMIN — INSULIN LISPRO 2 UNITS: 100 INJECTION, SOLUTION INTRAVENOUS; SUBCUTANEOUS at 11:55

## 2019-03-02 RX ADMIN — CEFTRIAXONE SODIUM 1000 MG: 10 INJECTION, POWDER, FOR SOLUTION INTRAVENOUS at 21:25

## 2019-03-02 RX ADMIN — VANCOMYCIN HYDROCHLORIDE 1750 MG: 1 INJECTION, POWDER, LYOPHILIZED, FOR SOLUTION INTRAVENOUS at 09:43

## 2019-03-02 RX ADMIN — LEVETIRACETAM 500 MG: 100 INJECTION, SOLUTION INTRAVENOUS at 11:55

## 2019-03-02 RX ADMIN — LEVALBUTEROL 1.25 MG: 1.25 SOLUTION, CONCENTRATE RESPIRATORY (INHALATION) at 07:46

## 2019-03-02 RX ADMIN — LEVETIRACETAM 500 MG: 500 TABLET, FILM COATED ORAL at 22:14

## 2019-03-02 RX ADMIN — CEFEPIME HYDROCHLORIDE 2000 MG: 1 INJECTION, POWDER, FOR SOLUTION INTRAMUSCULAR; INTRAVENOUS at 04:43

## 2019-03-02 RX ADMIN — INSULIN LISPRO 1 UNITS: 100 INJECTION, SOLUTION INTRAVENOUS; SUBCUTANEOUS at 17:14

## 2019-03-02 RX ADMIN — METHYLPREDNISOLONE SODIUM SUCCINATE 40 MG: 40 INJECTION, POWDER, FOR SOLUTION INTRAMUSCULAR; INTRAVENOUS at 21:34

## 2019-03-02 RX ADMIN — BUDESONIDE 0.5 MG: 0.5 INHALANT RESPIRATORY (INHALATION) at 20:37

## 2019-03-02 RX ADMIN — LEVALBUTEROL 1.25 MG: 1.25 SOLUTION, CONCENTRATE RESPIRATORY (INHALATION) at 20:37

## 2019-03-02 RX ADMIN — BUDESONIDE 0.5 MG: 0.5 INHALANT RESPIRATORY (INHALATION) at 07:46

## 2019-03-02 RX ADMIN — IPRATROPIUM BROMIDE 0.5 MG: 0.5 SOLUTION RESPIRATORY (INHALATION) at 20:37

## 2019-03-02 RX ADMIN — METRONIDAZOLE 500 MG: 500 INJECTION, SOLUTION INTRAVENOUS at 08:34

## 2019-03-02 RX ADMIN — CHLORHEXIDINE GLUCONATE 15 ML: 1.2 RINSE ORAL at 08:35

## 2019-03-02 RX ADMIN — IPRATROPIUM BROMIDE 0.5 MG: 0.5 SOLUTION RESPIRATORY (INHALATION) at 07:46

## 2019-03-02 RX ADMIN — TRAMADOL HYDROCHLORIDE 50 MG: 50 TABLET, COATED ORAL at 08:35

## 2019-03-02 RX ADMIN — GABAPENTIN 100 MG: 100 CAPSULE ORAL at 08:35

## 2019-03-02 RX ADMIN — GABAPENTIN 100 MG: 100 CAPSULE ORAL at 17:14

## 2019-03-02 RX ADMIN — LEVALBUTEROL 1.25 MG: 1.25 SOLUTION, CONCENTRATE RESPIRATORY (INHALATION) at 14:01

## 2019-03-02 RX ADMIN — CHLORHEXIDINE GLUCONATE 15 ML: 1.2 RINSE ORAL at 21:34

## 2019-03-02 RX ADMIN — OXCARBAZEPINE 300 MG: 300 TABLET, FILM COATED ORAL at 22:14

## 2019-03-02 RX ADMIN — ALBUTEROL SULFATE 2.5 MG: 2.5 SOLUTION RESPIRATORY (INHALATION) at 01:55

## 2019-03-02 NOTE — PROGRESS NOTES
UROLOGY PROGRESS NOTE   Patient Identifiers: Andrey Mckeon (MRN [id number])  Date of Service: 3/2/2019        Assessment:   41-year-old gentleman status post traumatic Guzman catheter placement with inability to regain access to the bladder, postoperative day 1  Status post bedside suprapubic catheter placement  Abdomen is soft and nontender, no signs of peritonitis, suprapubic catheter is functioning well  I did review with the patient that it is usually necessary to have a cystoscopic evaluation of the urethra along with imaging of the bladder and urethra to assess for advisability of eventual suprapubic catheter placement  He had originally asked that I remove his suprapubic catheter today, but after discussion of his pathophysiology he does understand that this would be a terrible idea  Plan:   Continue care per primary team     Patient will require follow-up in the urology office for cystoscopic evaluation again in the future after voiding cystourethrogram for evaluation of the prostatic urethra and pendulous urethra  Upon discharge, the patient's suprapubic catheter should be placed to a leg bag    Please re-consult as necessary      Subjective:     24 HR EVENTS:   no significant events  Patient has  no complaints        Objective:     VITALS:    Vitals:    03/02/19 0820   BP: 145/78   Pulse: 69   Resp: 19   Temp: 98 7 °F (37 1 °C)   SpO2: 97%       INS & OUTS:  [unfilled]  Reviewed pertinent values I's/O's      LABS:  Lab Results   Component Value Date    HGB 10 8 (L) 03/01/2019    HCT 27 9 (L) 03/01/2019    WBC 8 40 03/01/2019     (L) 03/01/2019   ]    Lab Results   Component Value Date    K 3 9 03/01/2019     03/01/2019    CO2 29 03/01/2019    BUN 22 03/01/2019    CREATININE 0 96 03/01/2019    CALCIUM 8 0 (L) 03/01/2019    GLUCOSE 182 (H) 02/28/2019   ]    INPATIENT MEDS:    Current Facility-Administered Medications:     albuterol inhalation solution 2 5 mg, 2 5 mg, Nebulization, Q6H PRN, STEVENSON Rey, 2 5 mg at 03/02/19 0155    budesonide (PULMICORT) inhalation solution 0 5 mg, 0 5 mg, Nebulization, Q12H, STEVENSON Rey, 0 5 mg at 03/02/19 0746    cefepime (MAXIPIME) 2 g/50 mL dextrose IVPB, 2,000 mg, Intravenous, Q12H, STEVENSON Rey, Last Rate: 100 mL/hr at 03/02/19 0443, 2,000 mg at 03/02/19 0443    chlorhexidine (PERIDEX) 0 12 % oral rinse 15 mL, 15 mL, Swish & Spit, Q12H [address], STEVENSON Rey, 15 mL at 03/02/19 0288    diltiazem (CARDIZEM CD) 24 hr capsule 120 mg, 120 mg, Oral, Daily, STEVENSON Rey, 120 mg at 03/02/19 5116    gabapentin (NEURONTIN) capsule 100 mg, 100 mg, Oral, BID, STEVENSON Rey, 100 mg at 03/02/19 0835    insulin lispro (HumaLOG) 100 units/mL subcutaneous injection 1-5 Units, 1-5 Units, Subcutaneous, HS, Lamin Prechtel, DO, 1 Units at 03/01/19 2136    insulin lispro (HumaLOG) 100 units/mL subcutaneous injection 1-6 Units, 1-6 Units, Subcutaneous, Q6H [address], Stopped at 03/01/19 0217 **AND** Fingerstick Glucose (POCT), , , Q6H, STEVENSON Rey    insulin lispro (HumaLOG) 100 units/mL subcutaneous injection 1-6 Units, 1-6 Units, Subcutaneous, TID AC **AND** Fingerstick Glucose (POCT), , , TID AC, Lamin Prechtel, DO    ipratropium (ATROVENT) 0 02 % inhalation solution 0 5 mg, 0 5 mg, Nebulization, TID, STEVENSON Rey, 0 5 mg at 03/02/19 0746    levalbuterol (XOPENEX) inhalation solution 1 25 mg, 1 25 mg, Nebulization, TID, STEVENSON Rey, 1 25 mg at 03/02/19 0746    levETIRAcetam (KEPPRA) 500 mg in sodium chloride 0 9 % 100 mL IVPB, 500 mg, Intravenous, Q12H [address], STEVENSON Rey, Stopped at 03/01/19 2256    levothyroxine tablet 200 mcg, 200 mcg, Oral, Early Morning, STEVENSON Rey, 200 mcg at 03/02/19 9674    methylPREDNISolone sodium succinate (Solu-MEDROL) injection 40 mg, 40 mg, Intravenous, Q8H [address], STEVENSON Rey, 40 mg at 03/02/19 8315    metroNIDAZOLE (FLAGYL) IVPB (premix) 500 mg, 500 mg, Intravenous, Q8H, Donnal Sanchez, CRNP, Last Rate: 200 mL/hr at 03/02/19 0834, 500 mg at 03/02/19 0834    OXcarbazepine (TRILEPTAL) tablet 300 mg, 300 mg, Oral, HS, Everardo Oh, DO, 300 mg at 03/01/19 2136    traMADol (ULTRAM) tablet 50 mg, 50 mg, Oral, BID, Everardo Oh, DO, 50 mg at 03/02/19 0835    vancomycin (VANCOCIN) 1,750 mg in sodium chloride 0 9 % 500 mL IVPB, 15 mg/kg, Intravenous, Q12H, Donnal Sanchez, CRNP, Stopped at 03/01/19 2208      Physical Exam:   /78 (BP Location: Left arm)   Pulse 69   Temp 98 7 °F (37 1 °C) (Temporal)   Resp 19   Wt 115 kg (253 lb 8 5 oz)   SpO2 97%   GEN: alert and oriented x 3  , poor dentition  RESP: breathing comfortably with no accessory muscle use    CARDIAC: peripheral pulses present and regular rate and rhythm    ABD: soft, non-tender, non-distended   INCISION: Clean insertion site of suprapubic catheter   EXT: no significant peripheral edema   DRAINS: none  NEURO: cranial nerves 2-12 intact, no sensory deficits, no motor deficits and the remainder of the neurological examination is unremarkable   PSYCHIATRIC: normal mood and affect and normal train of thought    GENITOURINARY:no bladder tenderness    scrotum normal and without induration/swelling/cellulitis      GIBBS: Suprapubic catheter and in place draining clear yellow urine  no clots      RADIOLOGY:   There are no new urologic films for my review

## 2019-03-02 NOTE — PROGRESS NOTES
[facility] Internal Medicine Progress Note  Patient: Wilbert Amezquita 66 y o  male   MRN: [id number]  PCP: Zechariah Harvey MD  Unit/Bed#: [facility] [address] [id number] Encounter: [id number]  Date Of Visit: 03/02/19      Assessment/plan  1  Acute hypoxic respiratory failure multifactorial due to pneumonia and copd with acute exacerbation- he was originally intubated in the icu  He was extubated yesterday  Pt is stable on oxygen  Continue bronchodilators  Steroids will be decreased to q12  Vancomycin, cefepime and flagyl were d/scot  He was changed to levaquin for 5 more days  Repeat procalcitonin is pending  He will eventually need to follow up with pulmonologist dr Minoo Astudillo from [facility]       2  Sepsis due to pneumonia,poa- see above     3  Mild boy- has since resolved       4  Acute metabolic encephalopathy, now improved    5  Atrial fibrillation on anticoagulation as an outpatient  eliquis was on hold due to urethral bleeding  H/h has been stable  No further bleeding/hematuria  Will restart  6  Normocytic anemia- h/h is stable  Will check in am  Will restart eliquis in am if h/h has remained stable  7  S/p traumatic gao cath placement  Could not access bladder  Pt had bedside suprapubic cath  Pt will follow up outpt  Will need a voiding cystourethrogram for evaluation of the prostatic urethra outpt    8  Seizure d/o- continue keppra, trileptal, and Neurontin    9  Schizoaffective and bipolar d/o- continue risperdal and trileptal    10  Hypothyroid- continue synthroid    11  One blood culture is positive- likely due to contaminant  12  Left heel pain- will obtain xray  13 dysphagia- continue speech eval  Continue current diet  dispo- will consult pt/ot    Subjective:   Pt seen and examined  Pts breathing has improved  During interview he took his oxygen out of his nose and sang a good portion of [place] pie  He is c/o heel pain on the left foot  No f/c no cp no worsening sob no n/v/d no abd pain       Objective: Vitals: Blood pressure 145/78, pulse 69, temperature 98 7 °F (37 1 °C), temperature source Temporal, resp  rate 19, weight 115 kg (253 lb 8 5 oz), SpO2 97 %  ,There is no height or weight on file to calculate BMI  Lab, Imaging and other studies:  Results from last 7 days   Lab Units 03/01/19  1537 03/01/19  0527  02/28/19  1340   WBC Thousand/uL  --  8 40  --  14 52*   HEMOGLOBIN g/dL 10 8* 8 7*  --  11 4*   I STAT HEMOGLOBIN   --   --    < >  --    HEMATOCRIT %  --  27 9*  --  37 2   HEMATOCRIT, ISTAT   --   --    < >  --    PLATELETS Thousands/uL  --  143*  --  199   INR   --   --   --  1 48*    < > = values in this interval not displayed       Results from last 7 days   Lab Units 03/01/19  0527 02/28/19  1346 02/28/19  1340   POTASSIUM mmol/L 3 9  --  4 3   CHLORIDE mmol/L 103  --  97*   CO2 mmol/L 29  --  32   CO2, I-STAT mmol/L  --  31  --    BUN mg/dL 22  --  23   CREATININE mg/dL 0 96  --  1 27   CALCIUM mg/dL 8 0*  --  8 6   ALK PHOS U/L  --   --  54   ALT U/L  --   --  18   AST U/L  --   --  14   GLUCOSE, ISTAT mg/dl  --  182*  --      Results from last 7 days   Lab Units 02/28/19  1340   TROPONIN I ng/mL <0 02     Lab Results   Component Value Date    BLOODCX No Growth at 24 hrs  02/28/2019    BLOODCX Streptococcus anginosus (A) 02/28/2019     Scheduled Meds:   Current Facility-Administered Medications:  albuterol 2 5 mg Nebulization Q6H PRN Danna Pines, CRNP    budesonide 0 5 mg Nebulization Q12H Danna Pines, CRNP    chlorhexidine 15 mL Swish & Spit Q12H [address] Danna Pines, CRNP    diltiazem 120 mg Oral Daily Danna Pines, CRNP    gabapentin 100 mg Oral BID Danna Pines, CRNP    insulin lispro 1-5 Units Subcutaneous HS Lamin Prechtel, DO    insulin lispro 1-6 Units Subcutaneous Q6H [address] Danna Pines, CRNP    insulin lispro 1-6 Units Subcutaneous TID IAIN Holly DO    ipratropium 0 5 mg Nebulization TID Danna Pines, CRNP    levalbuterol 1 25 mg Nebulization TID Danna Pines, CRNP    levETIRAcetam 500 mg Intravenous Q12H [address] Gee SandersSTEVENSON Last Rate: 500 mg (03/02/19 1155)   levofloxacin 750 mg Oral Q24H Amilcar Boogie MD    levothyroxine 200 mcg Oral Early Morning STEVENSON Aleman    methylPREDNISolone sodium succinate 40 mg Intravenous Q12H [address] Amilcar Boogie MD    OXcarbazepine 300 mg Oral HS Everardo Casiano,     traMADol 50 mg Oral BID Everardo Casiano DO      Continuous Infusions:    PRN Meds:   albuterol      Physical exam:  Physical Exam  General appearance: alert and oriented, in no acute distress  Head: Normocephalic, without obvious abnormality, atraumatic  Eyes: conjunctivae/corneas clear  PERRL, EOM's intact  Fundi benign    Neck: no adenopathy, no carotid bruit, no JVD, supple, symmetrical, trachea midline and thyroid not enlarged, symmetric, no tenderness/mass/nodules  Lungs: wheezes through out  Heart: regular rate and rhythm, S1, S2 normal, no murmur, click, rub or gallop  Abdomen: soft, non-tender; bowel sounds normal; no masses,  no organomegaly  Extremities: pt would not let me examine his left foot as he had just talked to the pulmonologist about this and it was placed up on pillow and his pain is better now  Pulses: 2+ and symmetric  Skin: Skin color, texture, turgor normal  No rashes or lesions  Neurologic: Mental status: awake alert oriented x3 with confusion      VTE Pharmacologic Prophylaxis: Reason for no pharmacologic prophylaxis hematuria  VTE Mechanical Prophylaxis: sequential compression device    Counseling / Coordination of Care  Total floor / unit time spent today 20 minutes      Current Length of Stay: 2 day(s)    Current Patient Status: Inpatient       Code Status: Level 2 - DNAR: but accepts endotracheal intubation

## 2019-03-02 NOTE — NURSING NOTE
Pt verbalized that he would like all life-saving measure to be performed on him in the event that he is unresponsive  When RN informed pt that he had an order for a level 2 and a POLST, pt stated, "I would never want that  I would never sign a paper that says not to bring me back " RN notified charge RN and hospital supervisor Samanta Bradley of this  Pt is alert and oriented x4 at this time

## 2019-03-02 NOTE — PLAN OF CARE
Problem: Potential for Falls  Goal: Patient will remain free of falls  Description  INTERVENTIONS:  - Assess patient frequently for physical needs  -  Identify cognitive and physical deficits and behaviors that affect risk of falls    -  [place] fall precautions as indicated by assessment   - Educate patient/family on patient safety including physical limitations  - Instruct patient to call for assistance with activity based on assessment  - Modify environment to reduce risk of injury  - Consider OT/PT consult to assist with strengthening/mobility  Outcome: Progressing     Problem: Prexisting or High Potential for Compromised Skin Integrity  Goal: Skin integrity is maintained or improved  Description  INTERVENTIONS:  - Identify patients at risk for skin breakdown  - Assess and monitor skin integrity  - Assess and monitor nutrition and hydration status  - Monitor labs (i e  albumin)  - Assess for incontinence   - Turn and reposition patient  - Assist with mobility/ambulation  - Relieve pressure over bony prominences  - Avoid friction and shearing  - Provide appropriate hygiene as needed including keeping skin clean and dry  - Evaluate need for skin moisturizer/barrier cream  - Collaborate with interdisciplinary team (i e  Nutrition, Rehabilitation, etc )   - Patient/family teaching  Outcome: Progressing     Problem: PAIN - ADULT  Goal: Verbalizes/displays adequate comfort level or baseline comfort level  Description  Interventions:  - Encourage patient to monitor pain and request assistance  - Assess pain using appropriate pain scale  - Administer analgesics based on type and severity of pain and evaluate response  - Implement non-pharmacological measures as appropriate and evaluate response  - Consider cultural and social influences on pain and pain management  - Notify physician/advanced practitioner if interventions unsuccessful or patient reports new pain  Outcome: Progressing     Problem: INFECTION - ADULT  Goal: Absence or prevention of progression during hospitalization  Description  INTERVENTIONS:  - Assess and monitor for signs and symptoms of infection  - Monitor lab/diagnostic results  - Monitor all insertion sites, i e  indwelling lines, tubes, and drains  - Monitor endotracheal (as able) and nasal secretions for changes in amount and color  - [place] appropriate cooling/warming therapies per order  - Administer medications as ordered  - Instruct and encourage patient and family to use good hand hygiene technique  - Identify and instruct in appropriate isolation precautions for identified infection/condition  Outcome: Progressing     Problem: DISCHARGE PLANNING  Goal: Discharge to home or other facility with appropriate resources  Description  INTERVENTIONS:  - Identify barriers to discharge w/patient and caregiver  - Arrange for needed discharge resources and transportation as appropriate  - Identify discharge learning needs (meds, wound care, etc )  - Arrange for interpretive services to assist at discharge as needed  - Refer to Case Management Department for coordinating discharge planning if the patient needs post-hospital services based on physician/advanced practitioner order or complex needs related to functional status, cognitive ability, or social support system  Outcome: Progressing     Problem: Knowledge Deficit  Goal: Patient/family/caregiver demonstrates understanding of disease process, treatment plan, medications, and discharge instructions  Description  Complete learning assessment and assess knowledge base    Interventions:  - Provide teaching at level of understanding  - Provide teaching via preferred learning methods  Outcome: Progressing     Problem: RESPIRATORY - ADULT  Goal: Achieves optimal ventilation and oxygenation  Description  INTERVENTIONS:  - Assess for changes in respiratory status  - Assess for changes in mentation and behavior  - Position to facilitate oxygenation and minimize respiratory effort  - Oxygen administration by appropriate delivery method based on oxygen saturation (per order) or ABGs  - Initiate smoking cessation education as indicated  - Encourage broncho-pulmonary hygiene including cough, deep breathe, Incentive Spirometry  - Assess the need for suctioning and aspirate as needed  - Assess and instruct to report SOB or any respiratory difficulty  - Respiratory Therapy support as indicated  Outcome: Progressing     Problem: GENITOURINARY - ADULT  Goal: Maintains or returns to baseline urinary function  Description  INTERVENTIONS:  - Assess urinary function  - Encourage oral fluids to ensure adequate hydration  - Administer IV fluids as ordered to ensure adequate hydration  - Administer ordered medications as needed  - Offer frequent toileting  - Follow urinary retention protocol if ordered  Outcome: Progressing  Goal: Absence of urinary retention  Description  INTERVENTIONS:  - Assess patient?s ability to void and empty bladder  - Monitor I/O  - Bladder scan as needed  - Discuss with physician/AP medications to alleviate retention as needed  - Discuss catheterization for long term situations as appropriate  Outcome: Progressing  Goal: Urinary catheter remains patent  Description  INTERVENTIONS:  - Assess patency of urinary catheter  - If patient has a chronic gao, consider changing catheter if non-functioning  - Follow guidelines for intermittent irrigation of non-functioning urinary catheter  Outcome: Progressing     Problem: SAFETY ADULT  Goal: Patient will remain free of falls  Description  INTERVENTIONS:  - Assess patient frequently for physical needs  -  Identify cognitive and physical deficits and behaviors that affect risk of falls    -  [place] fall precautions as indicated by assessment   - Educate patient/family on patient safety including physical limitations  - Instruct patient to call for assistance with activity based on assessment  - Modify environment to reduce risk of injury  - Consider OT/PT consult to assist with strengthening/mobility  Outcome: Progressing     Problem: NEUROSENSORY - ADULT  Goal: Achieves stable or improved neurological status  Description  INTERVENTIONS  - Monitor and report changes in neurological status  - Initiate measures to prevent increased intracranial pressure  - Maintain blood pressure and fluid volume within ordered parameters to optimize cerebral perfusion  - Monitor temperature, glucose, and sodium or any other associated labs  Initiate appropriate interventions as ordered  - Monitor for seizure activity   - Administer anti-seizure medications as ordered  Outcome: Progressing     Problem: CARDIOVASCULAR - ADULT  Goal: Maintains optimal cardiac output and hemodynamic stability  Description  INTERVENTIONS:  - Monitor I/O, vital signs and rhythm  - Monitor for S/S and trends of decreased cardiac output i e  bleeding, hypotension  - Administer and titrate ordered vasoactive medications to optimize hemodynamic stability  - Assess quality of pulses, skin color and temperature  - Assess for signs of decreased coronary artery perfusion - ex   Angina  - Instruct patient to report change in severity of symptoms  Outcome: Progressing     Problem: METABOLIC, FLUID AND ELECTROLYTES - ADULT  Goal: Electrolytes maintained within normal limits  Description  INTERVENTIONS:  - Monitor labs and assess patient for signs and symptoms of electrolyte imbalances  - Administer electrolyte replacement as ordered  - Monitor response to electrolyte replacements, including repeat lab results as appropriate  - Instruct patient on fluid and nutrition as appropriate  Outcome: Progressing  Goal: Glucose maintained within target range  Description  INTERVENTIONS:  - Monitor Blood Glucose as ordered  - Assess for signs and symptoms of hyperglycemia and hypoglycemia  - Administer ordered medications to maintain glucose within target range  - Assess nutritional intake and initiate nutrition service referral as needed  Outcome: Progressing     Problem: SKIN/TISSUE INTEGRITY - ADULT  Goal: Skin integrity remains intact  Description  INTERVENTIONS  - Identify patients at risk for skin breakdown  - Assess and monitor skin integrity  - Assess and monitor nutrition and hydration status  - Monitor labs (i e  albumin)  - Assess for incontinence   - Turn and reposition patient  - Assist with mobility/ambulation  - Relieve pressure over bony prominences  - Avoid friction and shearing  - Provide appropriate hygiene as needed including keeping skin clean and dry  - Evaluate need for skin moisturizer/barrier cream  - Collaborate with interdisciplinary team (i e  Nutrition, Rehabilitation, etc )   - Patient/family teaching  Outcome: Progressing

## 2019-03-02 NOTE — PLAN OF CARE
Problem: Potential for Falls  Goal: Patient will remain free of falls  Description  INTERVENTIONS:  - Assess patient frequently for physical needs  -  Identify cognitive and physical deficits and behaviors that affect risk of falls    -  [place] fall precautions as indicated by assessment   - Educate patient/family on patient safety including physical limitations  - Instruct patient to call for assistance with activity based on assessment  - Modify environment to reduce risk of injury  - Consider OT/PT consult to assist with strengthening/mobility  Outcome: Progressing     Problem: Prexisting or High Potential for Compromised Skin Integrity  Goal: Skin integrity is maintained or improved  Description  INTERVENTIONS:  - Identify patients at risk for skin breakdown  - Assess and monitor skin integrity  - Assess and monitor nutrition and hydration status  - Monitor labs (i e  albumin)  - Assess for incontinence   - Turn and reposition patient  - Assist with mobility/ambulation  - Relieve pressure over bony prominences  - Avoid friction and shearing  - Provide appropriate hygiene as needed including keeping skin clean and dry  - Evaluate need for skin moisturizer/barrier cream  - Collaborate with interdisciplinary team (i e  Nutrition, Rehabilitation, etc )   - Patient/family teaching  Outcome: Progressing     Problem: PAIN - ADULT  Goal: Verbalizes/displays adequate comfort level or baseline comfort level  Description  Interventions:  - Encourage patient to monitor pain and request assistance  - Assess pain using appropriate pain scale  - Administer analgesics based on type and severity of pain and evaluate response  - Implement non-pharmacological measures as appropriate and evaluate response  - Consider cultural and social influences on pain and pain management  - Notify physician/advanced practitioner if interventions unsuccessful or patient reports new pain  Outcome: Progressing     Problem: INFECTION - ADULT  Goal: Absence or prevention of progression during hospitalization  Description  INTERVENTIONS:  - Assess and monitor for signs and symptoms of infection  - Monitor lab/diagnostic results  - Monitor all insertion sites, i e  indwelling lines, tubes, and drains  - Monitor endotracheal (as able) and nasal secretions for changes in amount and color  - [place] appropriate cooling/warming therapies per order  - Administer medications as ordered  - Instruct and encourage patient and family to use good hand hygiene technique  - Identify and instruct in appropriate isolation precautions for identified infection/condition  Outcome: Progressing     Problem: DISCHARGE PLANNING  Goal: Discharge to home or other facility with appropriate resources  Description  INTERVENTIONS:  - Identify barriers to discharge w/patient and caregiver  - Arrange for needed discharge resources and transportation as appropriate  - Identify discharge learning needs (meds, wound care, etc )  - Arrange for interpretive services to assist at discharge as needed  - Refer to Case Management Department for coordinating discharge planning if the patient needs post-hospital services based on physician/advanced practitioner order or complex needs related to functional status, cognitive ability, or social support system  Outcome: Progressing     Problem: Knowledge Deficit  Goal: Patient/family/caregiver demonstrates understanding of disease process, treatment plan, medications, and discharge instructions  Description  Complete learning assessment and assess knowledge base    Interventions:  - Provide teaching at level of understanding  - Provide teaching via preferred learning methods  Outcome: Progressing     Problem: RESPIRATORY - ADULT  Goal: Achieves optimal ventilation and oxygenation  Description  INTERVENTIONS:  - Assess for changes in respiratory status  - Assess for changes in mentation and behavior  - Position to facilitate oxygenation and minimize respiratory effort  - Oxygen administration by appropriate delivery method based on oxygen saturation (per order) or ABGs  - Initiate smoking cessation education as indicated  - Encourage broncho-pulmonary hygiene including cough, deep breathe, Incentive Spirometry  - Assess the need for suctioning and aspirate as needed  - Assess and instruct to report SOB or any respiratory difficulty  - Respiratory Therapy support as indicated  Outcome: Progressing     Problem: GENITOURINARY - ADULT  Goal: Maintains or returns to baseline urinary function  Description  INTERVENTIONS:  - Assess urinary function  - Encourage oral fluids to ensure adequate hydration  - Administer IV fluids as ordered to ensure adequate hydration  - Administer ordered medications as needed  - Offer frequent toileting  - Follow urinary retention protocol if ordered  Outcome: Progressing  Goal: Absence of urinary retention  Description  INTERVENTIONS:  - Assess patient?s ability to void and empty bladder  - Monitor I/O  - Bladder scan as needed  - Discuss with physician/AP medications to alleviate retention as needed  - Discuss catheterization for long term situations as appropriate  Outcome: Progressing  Goal: Urinary catheter remains patent  Description  INTERVENTIONS:  - Assess patency of urinary catheter  - If patient has a chronic gao, consider changing catheter if non-functioning  - Follow guidelines for intermittent irrigation of non-functioning urinary catheter  Outcome: Progressing     Problem: SAFETY ADULT  Goal: Patient will remain free of falls  Description  INTERVENTIONS:  - Assess patient frequently for physical needs  -  Identify cognitive and physical deficits and behaviors that affect risk of falls    -  [place] fall precautions as indicated by assessment   - Educate patient/family on patient safety including physical limitations  - Instruct patient to call for assistance with activity based on assessment  - Modify environment to reduce risk of injury  - Consider OT/PT consult to assist with strengthening/mobility  Outcome: Progressing     Problem: NEUROSENSORY - ADULT  Goal: Achieves stable or improved neurological status  Description  INTERVENTIONS  - Monitor and report changes in neurological status  - Initiate measures to prevent increased intracranial pressure  - Maintain blood pressure and fluid volume within ordered parameters to optimize cerebral perfusion  - Monitor temperature, glucose, and sodium or any other associated labs  Initiate appropriate interventions as ordered  - Monitor for seizure activity   - Administer anti-seizure medications as ordered  Outcome: Progressing     Problem: CARDIOVASCULAR - ADULT  Goal: Maintains optimal cardiac output and hemodynamic stability  Description  INTERVENTIONS:  - Monitor I/O, vital signs and rhythm  - Monitor for S/S and trends of decreased cardiac output i e  bleeding, hypotension  - Administer and titrate ordered vasoactive medications to optimize hemodynamic stability  - Assess quality of pulses, skin color and temperature  - Assess for signs of decreased coronary artery perfusion - ex   Angina  - Instruct patient to report change in severity of symptoms  Outcome: Progressing     Problem: METABOLIC, FLUID AND ELECTROLYTES - ADULT  Goal: Electrolytes maintained within normal limits  Description  INTERVENTIONS:  - Monitor labs and assess patient for signs and symptoms of electrolyte imbalances  - Administer electrolyte replacement as ordered  - Monitor response to electrolyte replacements, including repeat lab results as appropriate  - Instruct patient on fluid and nutrition as appropriate  Outcome: Progressing  Goal: Glucose maintained within target range  Description  INTERVENTIONS:  - Monitor Blood Glucose as ordered  - Assess for signs and symptoms of hyperglycemia and hypoglycemia  - Administer ordered medications to maintain glucose within target range  - Assess nutritional intake and initiate nutrition service referral as needed  Outcome: Progressing     Problem: SKIN/TISSUE INTEGRITY - ADULT  Goal: Skin integrity remains intact  Description  INTERVENTIONS  - Identify patients at risk for skin breakdown  - Assess and monitor skin integrity  - Assess and monitor nutrition and hydration status  - Monitor labs (i e  albumin)  - Assess for incontinence   - Turn and reposition patient  - Assist with mobility/ambulation  - Relieve pressure over bony prominences  - Avoid friction and shearing  - Provide appropriate hygiene as needed including keeping skin clean and dry  - Evaluate need for skin moisturizer/barrier cream  - Collaborate with interdisciplinary team (i e  Nutrition, Rehabilitation, etc )   - Patient/family teaching  Outcome: Progressing

## 2019-03-02 NOTE — TELEPHONE ENCOUNTER
Patient is status post bedside suprapubic catheter placement due to Guzman catheter trauma previously and malposition  He will need a voiding cystourethrogram prior to his follow-up visit in the Urology Office for cystoscopy  He will also need and up sizing of his suprapubic catheter in the Urology setting at his next visit

## 2019-03-03 LAB
ANION GAP SERPL CALCULATED.3IONS-SCNC: 5 MMOL/L (ref 4–13)
BACTERIA BLD CULT: ABNORMAL
BUN SERPL-MCNC: 21 MG/DL (ref 5–25)
CALCIUM SERPL-MCNC: 8.7 MG/DL (ref 8.3–10.1)
CHLORIDE SERPL-SCNC: 106 MMOL/L (ref 100–108)
CO2 SERPL-SCNC: 31 MMOL/L (ref 21–32)
CREAT SERPL-MCNC: 0.79 MG/DL (ref 0.6–1.3)
ERYTHROCYTE [DISTWIDTH] IN BLOOD BY AUTOMATED COUNT: 13.7 % (ref 11.6–15.1)
GFR SERPL CREATININE-BSD FRML MDRD: 86 ML/MIN/1.73SQ M
GLUCOSE SERPL-MCNC: 124 MG/DL (ref 65–140)
GLUCOSE SERPL-MCNC: 128 MG/DL (ref 65–140)
GLUCOSE SERPL-MCNC: 136 MG/DL (ref 65–140)
GLUCOSE SERPL-MCNC: 139 MG/DL (ref 65–140)
GLUCOSE SERPL-MCNC: 177 MG/DL (ref 65–140)
GRAM STN SPEC: ABNORMAL
HCT VFR BLD AUTO: 34.1 % (ref 36.5–49.3)
HGB BLD-MCNC: 10.7 G/DL (ref 12–17)
MCH RBC QN AUTO: 30 PG (ref 26.8–34.3)
MCHC RBC AUTO-ENTMCNC: 31.4 G/DL (ref 31.4–37.4)
MCV RBC AUTO: 96 FL (ref 82–98)
PLATELET # BLD AUTO: 194 THOUSANDS/UL (ref 149–390)
PMV BLD AUTO: 9.4 FL (ref 8.9–12.7)
POTASSIUM SERPL-SCNC: 4.3 MMOL/L (ref 3.5–5.3)
PROCALCITONIN SERPL-MCNC: 6.76 NG/ML
RBC # BLD AUTO: 3.57 MILLION/UL (ref 3.88–5.62)
SODIUM SERPL-SCNC: 142 MMOL/L (ref 136–145)
WBC # BLD AUTO: 9.84 THOUSAND/UL (ref 4.31–10.16)

## 2019-03-03 PROCEDURE — 85027 COMPLETE CBC AUTOMATED: CPT | Performed by: INTERNAL MEDICINE

## 2019-03-03 PROCEDURE — 82948 REAGENT STRIP/BLOOD GLUCOSE: CPT

## 2019-03-03 PROCEDURE — 94760 N-INVAS EAR/PLS OXIMETRY 1: CPT

## 2019-03-03 PROCEDURE — 94640 AIRWAY INHALATION TREATMENT: CPT

## 2019-03-03 PROCEDURE — 99232 SBSQ HOSP IP/OBS MODERATE 35: CPT | Performed by: INTERNAL MEDICINE

## 2019-03-03 PROCEDURE — 80048 BASIC METABOLIC PNL TOTAL CA: CPT | Performed by: INTERNAL MEDICINE

## 2019-03-03 PROCEDURE — 84145 PROCALCITONIN (PCT): CPT | Performed by: INTERNAL MEDICINE

## 2019-03-03 RX ADMIN — BUDESONIDE 0.5 MG: 0.5 INHALANT RESPIRATORY (INHALATION) at 20:13

## 2019-03-03 RX ADMIN — GABAPENTIN 100 MG: 100 CAPSULE ORAL at 08:45

## 2019-03-03 RX ADMIN — BUDESONIDE 0.5 MG: 0.5 INHALANT RESPIRATORY (INHALATION) at 09:29

## 2019-03-03 RX ADMIN — IPRATROPIUM BROMIDE 0.5 MG: 0.5 SOLUTION RESPIRATORY (INHALATION) at 20:12

## 2019-03-03 RX ADMIN — CEFTRIAXONE SODIUM 1000 MG: 10 INJECTION, POWDER, FOR SOLUTION INTRAVENOUS at 20:24

## 2019-03-03 RX ADMIN — LEVALBUTEROL 1.25 MG: 1.25 SOLUTION, CONCENTRATE RESPIRATORY (INHALATION) at 14:48

## 2019-03-03 RX ADMIN — TRAMADOL HYDROCHLORIDE 50 MG: 50 TABLET, COATED ORAL at 08:45

## 2019-03-03 RX ADMIN — TRAMADOL HYDROCHLORIDE 50 MG: 50 TABLET, COATED ORAL at 17:32

## 2019-03-03 RX ADMIN — IPRATROPIUM BROMIDE 0.5 MG: 0.5 SOLUTION RESPIRATORY (INHALATION) at 14:48

## 2019-03-03 RX ADMIN — APIXABAN 5 MG: 5 TABLET, FILM COATED ORAL at 17:32

## 2019-03-03 RX ADMIN — LEVOTHYROXINE SODIUM 200 MCG: 100 TABLET ORAL at 05:14

## 2019-03-03 RX ADMIN — PREDNISONE 40 MG: 20 TABLET ORAL at 08:45

## 2019-03-03 RX ADMIN — LEVETIRACETAM 500 MG: 500 TABLET, FILM COATED ORAL at 08:45

## 2019-03-03 RX ADMIN — DILTIAZEM HYDROCHLORIDE 120 MG: 120 CAPSULE, COATED, EXTENDED RELEASE ORAL at 08:46

## 2019-03-03 RX ADMIN — CHLORHEXIDINE GLUCONATE 15 ML: 1.2 RINSE ORAL at 21:33

## 2019-03-03 RX ADMIN — INSULIN LISPRO 1 UNITS: 100 INJECTION, SOLUTION INTRAVENOUS; SUBCUTANEOUS at 21:34

## 2019-03-03 RX ADMIN — OXCARBAZEPINE 300 MG: 300 TABLET, FILM COATED ORAL at 21:33

## 2019-03-03 RX ADMIN — APIXABAN 5 MG: 5 TABLET, FILM COATED ORAL at 08:45

## 2019-03-03 RX ADMIN — LEVALBUTEROL 1.25 MG: 1.25 SOLUTION, CONCENTRATE RESPIRATORY (INHALATION) at 09:29

## 2019-03-03 RX ADMIN — LEVETIRACETAM 500 MG: 500 TABLET, FILM COATED ORAL at 21:33

## 2019-03-03 RX ADMIN — IPRATROPIUM BROMIDE 0.5 MG: 0.5 SOLUTION RESPIRATORY (INHALATION) at 09:29

## 2019-03-03 RX ADMIN — LEVALBUTEROL 1.25 MG: 1.25 SOLUTION, CONCENTRATE RESPIRATORY (INHALATION) at 20:13

## 2019-03-03 RX ADMIN — GABAPENTIN 100 MG: 100 CAPSULE ORAL at 17:32

## 2019-03-03 NOTE — PROGRESS NOTES
[address] Internal Medicine Progress Note  Patient: Josh Woods 66 y o  male   MRN: [id number]  PCP: Jake Novak MD  Unit/Bed#: [facility] [address] [address] Encounter: [id number]  Date Of Visit: 03/03/19      Assessment/plan  1  Acute hypoxic respiratory failure multifactorial due to pneumonia and copd with acute exacerbation- he was originally intubated in the icu  He was extubated on 3/1  Pt is stable on oxygen  Continue bronchodilators  Continue steroid taper  Taper by 10mg every 3 days  He is on day 1/3 for 40mg  Vancomycin, cefepime and flagyl were d/scot  He was changed to ceftriaxone  Due to blood cultures  If repeat blood cultures remain negative he can be changed to ceftin for 10 days total  He will eventually need to follow up with pulmonologist dr Rahat Turner from [facility]        2  Sepsis due to pneumonia,poa- see above     3  Mild boy- has since resolved       4  Acute metabolic encephalopathy, now improved     5  Atrial fibrillation on anticoagulation as an outpatient  eliquis was on hold due to urethral bleeding  No further bleeding/hematuria  eliquis was restarted  H/h has remained stable       6  Normocytic anemia- h/h is stable       7  S/p traumatic gao cath placement  Could not access bladder  Pt had bedside suprapubic cath  Pt will follow up outpt  Will need a voiding cystourethrogram for evaluation of the prostatic urethra outpt     8  Seizure d/o- continue keppra, trileptal, and Neurontin     9  Schizoaffective and bipolar d/o- continue risperdal and trileptal     10  Hypothyroid- continue synthroid     11  One blood culture is positive for strep anginosus  He was changed to ceftriaxone  Repeat blood cultures are pending       12  Left heel pain- xray reviewed       13 dysphagia- continue speech eval  Continue current diet       dispo- awaiting pt/ot eval      Subjective:   Pt seen and examined  He is c/o of diet but did explain to pt that he was not interested in seeing speech when they were here  Explained that they would have to evaluate him to make further changes with his diet  Pt is now agreeable to see speech  His foot pain is better  He is breathing better  No other problems  Objective:     Vitals: Blood pressure 153/90, pulse 87, temperature 98 6 °F (37 °C), temperature source Temporal, resp  rate 18, weight 115 kg (253 lb 8 5 oz), SpO2 95 %  ,There is no height or weight on file to calculate BMI  Lab, Imaging and other studies:  Results from last 7 days   Lab Units 03/03/19  0547  02/28/19  1340   WBC Thousand/uL 9 84   < > 14 52*   HEMOGLOBIN g/dL 10 7*   < > 11 4*   I STAT HEMOGLOBIN   --    < >  --    HEMATOCRIT % 34 1*   < > 37 2   HEMATOCRIT, ISTAT   --    < >  --    PLATELETS Thousands/uL 194   < > 199   INR   --   --  1 48*    < > = values in this interval not displayed  Results from last 7 days   Lab Units 03/03/19  0547  02/28/19  1346 02/28/19  1340   POTASSIUM mmol/L 4 3   < >  --  4 3   CHLORIDE mmol/L 106   < >  --  97*   CO2 mmol/L 31   < >  --  32   CO2, I-STAT mmol/L  --   --  31  --    BUN mg/dL 21   < >  --  23   CREATININE mg/dL 0 79   < >  --  1 27   CALCIUM mg/dL 8 7   < >  --  8 6   ALK PHOS U/L  --   --   --  54   ALT U/L  --   --   --  18   AST U/L  --   --   --  14   GLUCOSE, ISTAT mg/dl  --   --  182*  --     < > = values in this interval not displayed       Results from last 7 days   Lab Units 02/28/19  1340   TROPONIN I ng/mL <0 02     Lab Results   Component Value Date    BLOODCX No Growth at 48 hrs  02/28/2019    BLOODCX Streptococcus anginosus (A) 02/28/2019     Scheduled Meds:   Current Facility-Administered Medications:  albuterol 2 5 mg Nebulization Q6H PRN STEVENSON Burns    apixaban 5 mg Oral BID Carrie Madrigal DO    budesonide 0 5 mg Nebulization Q12H STEVENSON Burns    cefTRIAXone 1,000 mg Intravenous Q24H Sloane Thomas MD Last Rate: 1,000 mg (03/02/19 2125)   chlorhexidine 15 mL [facility] STEVENSON Burns    diltiazem 120 mg Oral Daily Duayne Svetlana, CRNP    gabapentin 100 mg Oral BID Duayne Svetlana, CRNP    insulin lispro 1-5 Units Subcutaneous HS Lamin Prechtel, DO    insulin lispro 1-6 Units Subcutaneous Q6H [address] Duayne Svetlana, CRNP    insulin lispro 1-6 Units Subcutaneous TID AC Lamin Prechtel, DO    ipratropium 0 5 mg Nebulization TID Duayne Svetlana, CRNP    levalbuterol 1 25 mg Nebulization TID Duayne Svetlana, CRNP    levETIRAcetam 500 mg Oral Q12H [address] Carrie Ambron, DO    levothyroxine 200 mcg Oral Early Morning Dulancene Svetlana, CRNP    OXcarbazepine 300 mg Oral HS Everardo Casiano,     predniSONE 40 mg Oral Daily Peter Gray MD    traMADol 50 mg Oral BID Everardo Casiano DO      Continuous Infusions:    PRN Meds:   albuterol      Physical exam:  Physical Exam  General appearance: alert  Head: Normocephalic, without obvious abnormality, atraumatic  Eyes: conjunctivae/corneas clear  PERRL, EOM's intact  Fundi benign  Neck: no adenopathy, no carotid bruit, no JVD, supple, symmetrical, trachea midline and thyroid not enlarged, symmetric, no tenderness/mass/nodules  Lungs: mild coarse breath sounds   no wheezing bilateral  Heart: regular rate and rhythm, S1, S2 normal, no murmur, click, rub or gallop  Abdomen: soft, non-tender; bowel sounds normal; no masses,  no organomegaly  Extremities: extremities normal, warm and well-perfused; no cyanosis, clubbing, or edema  Pulses: 2+ and symmetric  Skin: Skin color, texture, turgor normal  No rashes or lesions  Neurologic: Mental status: Alert, oriented, thought content appropriate, with mild confusion      VTE Pharmacologic Prophylaxis: eliquis  VTE Mechanical Prophylaxis: sequential compression device    Counseling / Coordination of Care  Total floor / unit time spent today 20 minutes      Current Length of Stay: 3 day(s)    Current Patient Status: Inpatient       Code Status: Level 2 - DNAR: but accepts endotracheal intubation

## 2019-03-03 NOTE — PROGRESS NOTES
Patient refused assessment this morning, stating "the staff is harassing me"  Patient also refusing to keep his oxygen on  Educated patient on the importance of keeping the oxygen in his nose and patient stated "get out of my room!"

## 2019-03-03 NOTE — PLAN OF CARE
Problem: Potential for Falls  Goal: Patient will remain free of falls  Description  INTERVENTIONS:  - Assess patient frequently for physical needs  -  Identify cognitive and physical deficits and behaviors that affect risk of falls    -  [place] fall precautions as indicated by assessment   - Educate patient/family on patient safety including physical limitations  - Instruct patient to call for assistance with activity based on assessment  - Modify environment to reduce risk of injury  - Consider OT/PT consult to assist with strengthening/mobility  Outcome: Progressing     Problem: Prexisting or High Potential for Compromised Skin Integrity  Goal: Skin integrity is maintained or improved  Description  INTERVENTIONS:  - Identify patients at risk for skin breakdown  - Assess and monitor skin integrity  - Assess and monitor nutrition and hydration status  - Monitor labs (i e  albumin)  - Assess for incontinence   - Turn and reposition patient  - Assist with mobility/ambulation  - Relieve pressure over bony prominences  - Avoid friction and shearing  - Provide appropriate hygiene as needed including keeping skin clean and dry  - Evaluate need for skin moisturizer/barrier cream  - Collaborate with interdisciplinary team (i e  Nutrition, Rehabilitation, etc )   - Patient/family teaching  Outcome: Progressing     Problem: PAIN - ADULT  Goal: Verbalizes/displays adequate comfort level or baseline comfort level  Description  Interventions:  - Encourage patient to monitor pain and request assistance  - Assess pain using appropriate pain scale  - Administer analgesics based on type and severity of pain and evaluate response  - Implement non-pharmacological measures as appropriate and evaluate response  - Consider cultural and social influences on pain and pain management  - Notify physician/advanced practitioner if interventions unsuccessful or patient reports new pain  Outcome: Progressing     Problem: INFECTION - ADULT  Goal: Absence or prevention of progression during hospitalization  Description  INTERVENTIONS:  - Assess and monitor for signs and symptoms of infection  - Monitor lab/diagnostic results  - Monitor all insertion sites, i e  indwelling lines, tubes, and drains  - Monitor endotracheal (as able) and nasal secretions for changes in amount and color  - [place] appropriate cooling/warming therapies per order  - Administer medications as ordered  - Instruct and encourage patient and family to use good hand hygiene technique  - Identify and instruct in appropriate isolation precautions for identified infection/condition  Outcome: Progressing     Problem: DISCHARGE PLANNING  Goal: Discharge to home or other facility with appropriate resources  Description  INTERVENTIONS:  - Identify barriers to discharge w/patient and caregiver  - Arrange for needed discharge resources and transportation as appropriate  - Identify discharge learning needs (meds, wound care, etc )  - Arrange for interpretive services to assist at discharge as needed  - Refer to Case Management Department for coordinating discharge planning if the patient needs post-hospital services based on physician/advanced practitioner order or complex needs related to functional status, cognitive ability, or social support system  Outcome: Progressing     Problem: Knowledge Deficit  Goal: Patient/family/caregiver demonstrates understanding of disease process, treatment plan, medications, and discharge instructions  Description  Complete learning assessment and assess knowledge base    Interventions:  - Provide teaching at level of understanding  - Provide teaching via preferred learning methods  Outcome: Progressing     Problem: RESPIRATORY - ADULT  Goal: Achieves optimal ventilation and oxygenation  Description  INTERVENTIONS:  - Assess for changes in respiratory status  - Assess for changes in mentation and behavior  - Position to facilitate oxygenation and minimize respiratory effort  - Oxygen administration by appropriate delivery method based on oxygen saturation (per order) or ABGs  - Initiate smoking cessation education as indicated  - Encourage broncho-pulmonary hygiene including cough, deep breathe, Incentive Spirometry  - Assess the need for suctioning and aspirate as needed  - Assess and instruct to report SOB or any respiratory difficulty  - Respiratory Therapy support as indicated  Outcome: Progressing     Problem: GENITOURINARY - ADULT  Goal: Maintains or returns to baseline urinary function  Description  INTERVENTIONS:  - Assess urinary function  - Encourage oral fluids to ensure adequate hydration  - Administer IV fluids as ordered to ensure adequate hydration  - Administer ordered medications as needed  - Offer frequent toileting  - Follow urinary retention protocol if ordered  Outcome: Progressing  Goal: Absence of urinary retention  Description  INTERVENTIONS:  - Assess patient?s ability to void and empty bladder  - Monitor I/O  - Bladder scan as needed  - Discuss with physician/AP medications to alleviate retention as needed  - Discuss catheterization for long term situations as appropriate  Outcome: Progressing  Goal: Urinary catheter remains patent  Description  INTERVENTIONS:  - Assess patency of urinary catheter  - If patient has a chronic gao, consider changing catheter if non-functioning  - Follow guidelines for intermittent irrigation of non-functioning urinary catheter  Outcome: Progressing     Problem: SAFETY ADULT  Goal: Patient will remain free of falls  Description  INTERVENTIONS:  - Assess patient frequently for physical needs  -  Identify cognitive and physical deficits and behaviors that affect risk of falls    -  [place] fall precautions as indicated by assessment   - Educate patient/family on patient safety including physical limitations  - Instruct patient to call for assistance with activity based on assessment  - Modify environment to reduce risk of injury  - Consider OT/PT consult to assist with strengthening/mobility  Outcome: Progressing     Problem: NEUROSENSORY - ADULT  Goal: Achieves stable or improved neurological status  Description  INTERVENTIONS  - Monitor and report changes in neurological status  - Initiate measures to prevent increased intracranial pressure  - Maintain blood pressure and fluid volume within ordered parameters to optimize cerebral perfusion  - Monitor temperature, glucose, and sodium or any other associated labs  Initiate appropriate interventions as ordered  - Monitor for seizure activity   - Administer anti-seizure medications as ordered  Outcome: Progressing     Problem: CARDIOVASCULAR - ADULT  Goal: Maintains optimal cardiac output and hemodynamic stability  Description  INTERVENTIONS:  - Monitor I/O, vital signs and rhythm  - Monitor for S/S and trends of decreased cardiac output i e  bleeding, hypotension  - Administer and titrate ordered vasoactive medications to optimize hemodynamic stability  - Assess quality of pulses, skin color and temperature  - Assess for signs of decreased coronary artery perfusion - ex   Angina  - Instruct patient to report change in severity of symptoms  Outcome: Progressing     Problem: METABOLIC, FLUID AND ELECTROLYTES - ADULT  Goal: Electrolytes maintained within normal limits  Description  INTERVENTIONS:  - Monitor labs and assess patient for signs and symptoms of electrolyte imbalances  - Administer electrolyte replacement as ordered  - Monitor response to electrolyte replacements, including repeat lab results as appropriate  - Instruct patient on fluid and nutrition as appropriate  Outcome: Progressing  Goal: Glucose maintained within target range  Description  INTERVENTIONS:  - Monitor Blood Glucose as ordered  - Assess for signs and symptoms of hyperglycemia and hypoglycemia  - Administer ordered medications to maintain glucose within target range  - Assess nutritional intake and initiate nutrition service referral as needed  Outcome: Progressing     Problem: SKIN/TISSUE INTEGRITY - ADULT  Goal: Skin integrity remains intact  Description  INTERVENTIONS  - Identify patients at risk for skin breakdown  - Assess and monitor skin integrity  - Assess and monitor nutrition and hydration status  - Monitor labs (i e  albumin)  - Assess for incontinence   - Turn and reposition patient  - Assist with mobility/ambulation  - Relieve pressure over bony prominences  - Avoid friction and shearing  - Provide appropriate hygiene as needed including keeping skin clean and dry  - Evaluate need for skin moisturizer/barrier cream  - Collaborate with interdisciplinary team (i e  Nutrition, Rehabilitation, etc )   - Patient/family teaching  Outcome: Progressing     Problem: Nutrition/Hydration-ADULT  Goal: Nutrient/Hydration intake appropriate for improving, restoring or maintaining nutritional needs  Description  Monitor and assess patient's nutrition/hydration status for malnutrition (ex- brittle hair, bruises, dry skin, pale skin and conjunctiva, muscle wasting, smooth red tongue, and disorientation)  Collaborate with interdisciplinary team and initiate plan and interventions as ordered  Monitor patient's weight and dietary intake as ordered or per policy  Utilize nutrition screening tool and intervene per policy  Determine patient's food preferences and provide high-protein, high-caloric foods as appropriate       INTERVENTIONS:  - Monitor oral intake, urinary output, labs, and treatment plans  - Assess nutrition and hydration status and recommend course of action  - Evaluate amount of meals eaten  - Assist patient with eating if necessary   - Allow adequate time for meals  - Recommend/ encourage appropriate diets, oral nutritional supplements, and vitamin/mineral supplements  - Order, calculate, and assess calorie counts as needed  - Recommend, monitor, and adjust tube feedings and TPN/PPN based on assessed needs  - Assess need for intravenous fluids  - Provide specific nutrition/hydration education as appropriate  - Include patient/family/caregiver in decisions related to nutrition  Outcome: Progressing

## 2019-03-03 NOTE — PROGRESS NOTES
Progress Note - Pulmonary   Danny Castro 66 y o  male MRN: [id number]  [facility] Encounter: [id number]    Assessment:  1  Acute on chronic hypoxic respiratory failure, improving  2  Status post respiratory arrest, resolved  3  COPD of unknown severity with acute exacerbation, improving  4  Pneumonia/ HCAP with Streptococcus anginosus with bacteremia    Plan:  · Continue oxygen titrate for pulse ox more than 88%  · Stop Flagyl and vancomycin and cefepime  · Switched to ceftriaxone (initially ordered Levaquin but I changed to ceftriaxone), patient tolerated cefepime without any allergic reaction  · Repeat blood culture, if remains negative then can switch to oral cephalosporin to continue course of 10 days of antibiotics total  · Continue bronchodilators nebulizer therapy  · Decrease Solu-Medrol to q 12 hours today and switch to prednisone tomorrow at 40 mg and taper by 10 mg every 3 days to stop  · Discussed with primary attending   · Outpatient follow-up with his pulmonologist at [facility] Dr Michael Cleveland    ----------------------------------------------------------------------------------------------------------------------    HPI/Interval History:   Patient feels better with improvement of shortness of Breath, continues to have mild cough, a denies chest pain, complains of pain at the suprapubic catheter/urethral area and also complains of pain in his left heel and he states that he had chronic injury/pain for many years and gets intermittent pain in his left heel but now is worse  Vitals:   Blood pressure 117/52, pulse 78, temperature 98 6 °F (37 °C), temperature source Temporal, resp  rate 18, weight 115 kg (253 lb 8 5 oz), SpO2 95 %  ,There is no height or weight on file to calculate BMI  SpO2: 95 %  SpO2 Activity: At Rest  O2 Device: Nasal cannula    Physical Exam:   Gen: Alert and without respiratory distress  HEENT: PERRL  O/P: clear  no erythema or exudate    Chest:  Equal but diminished breath sounds bilaterally with Bilateral mild wheezing  Cardiac:  S1-S2 regular  Abdomen: Soft and nontender  Bowel sounds are present  Extremities:  No edema  Patient has tenderess with palpation of left heel but no Achilles tendon pain or tenderness      Labs:    CBC:  Results from last 7 days   Lab Units 03/01/19  1537 03/01/19  0527 02/28/19  1346 02/28/19  1340   WBC Thousand/uL  --  8 40  --  14 52*   HEMOGLOBIN g/dL 10 8* 8 7*  --  11 4*   I STAT HEMOGLOBIN g/dl  --   --  12 6  --    HEMATOCRIT %  --  27 9*  --  37 2   HEMATOCRIT, ISTAT %  --   --  37  --    PLATELETS Thousands/uL  --  143*  --  199       CMP:   Results from last 7 days   Lab Units 03/01/19 0527 02/28/19  1346 02/28/19  1340   POTASSIUM mmol/L 3 9  --  4 3   CHLORIDE mmol/L 103  --  97*   CO2 mmol/L 29  --  32   CO2, I-STAT mmol/L  --  31  --    BUN mg/dL 22  --  23   CREATININE mg/dL 0 96  --  1 27   CALCIUM mg/dL 8 0*  --  8 6   ALK PHOS U/L  --   --  54   ALT U/L  --   --  18   AST U/L  --   --  14   GLUCOSE, ISTAT mg/dl  --  182*  --            Kylee Layton MD    Portions of the record may have been created with voice recognition software  Occasional wrong word or "sound a like" substitutions may have occurred due to the inherent limitations of voice recognition software  Read the chart carefully and recognize, using context, where substitutions have occurred

## 2019-03-03 NOTE — PLAN OF CARE
Problem: Potential for Falls  Goal: Patient will remain free of falls  Description  INTERVENTIONS:  - Assess patient frequently for physical needs  -  Identify cognitive and physical deficits and behaviors that affect risk of falls    -  [place] fall precautions as indicated by assessment   - Educate patient/family on patient safety including physical limitations  - Instruct patient to call for assistance with activity based on assessment  - Modify environment to reduce risk of injury  - Consider OT/PT consult to assist with strengthening/mobility  Outcome: Progressing     Problem: Prexisting or High Potential for Compromised Skin Integrity  Goal: Skin integrity is maintained or improved  Description  INTERVENTIONS:  - Identify patients at risk for skin breakdown  - Assess and monitor skin integrity  - Assess and monitor nutrition and hydration status  - Monitor labs (i e  albumin)  - Assess for incontinence   - Turn and reposition patient  - Assist with mobility/ambulation  - Relieve pressure over bony prominences  - Avoid friction and shearing  - Provide appropriate hygiene as needed including keeping skin clean and dry  - Evaluate need for skin moisturizer/barrier cream  - Collaborate with interdisciplinary team (i e  Nutrition, Rehabilitation, etc )   - Patient/family teaching  Outcome: Progressing     Problem: PAIN - ADULT  Goal: Verbalizes/displays adequate comfort level or baseline comfort level  Description  Interventions:  - Encourage patient to monitor pain and request assistance  - Assess pain using appropriate pain scale  - Administer analgesics based on type and severity of pain and evaluate response  - Implement non-pharmacological measures as appropriate and evaluate response  - Consider cultural and social influences on pain and pain management  - Notify physician/advanced practitioner if interventions unsuccessful or patient reports new pain  Outcome: Progressing     Problem: INFECTION - ADULT  Goal: Absence or prevention of progression during hospitalization  Description  INTERVENTIONS:  - Assess and monitor for signs and symptoms of infection  - Monitor lab/diagnostic results  - Monitor all insertion sites, i e  indwelling lines, tubes, and drains  - Monitor endotracheal (as able) and nasal secretions for changes in amount and color  - [place] appropriate cooling/warming therapies per order  - Administer medications as ordered  - Instruct and encourage patient and family to use good hand hygiene technique  - Identify and instruct in appropriate isolation precautions for identified infection/condition  Outcome: Progressing     Problem: DISCHARGE PLANNING  Goal: Discharge to home or other facility with appropriate resources  Description  INTERVENTIONS:  - Identify barriers to discharge w/patient and caregiver  - Arrange for needed discharge resources and transportation as appropriate  - Identify discharge learning needs (meds, wound care, etc )  - Arrange for interpretive services to assist at discharge as needed  - Refer to Case Management Department for coordinating discharge planning if the patient needs post-hospital services based on physician/advanced practitioner order or complex needs related to functional status, cognitive ability, or social support system  Outcome: Progressing     Problem: Knowledge Deficit  Goal: Patient/family/caregiver demonstrates understanding of disease process, treatment plan, medications, and discharge instructions  Description  Complete learning assessment and assess knowledge base    Interventions:  - Provide teaching at level of understanding  - Provide teaching via preferred learning methods  Outcome: Progressing     Problem: RESPIRATORY - ADULT  Goal: Achieves optimal ventilation and oxygenation  Description  INTERVENTIONS:  - Assess for changes in respiratory status  - Assess for changes in mentation and behavior  - Position to facilitate oxygenation and minimize respiratory effort  - Oxygen administration by appropriate delivery method based on oxygen saturation (per order) or ABGs  - Initiate smoking cessation education as indicated  - Encourage broncho-pulmonary hygiene including cough, deep breathe, Incentive Spirometry  - Assess the need for suctioning and aspirate as needed  - Assess and instruct to report SOB or any respiratory difficulty  - Respiratory Therapy support as indicated  Outcome: Progressing     Problem: GENITOURINARY - ADULT  Goal: Maintains or returns to baseline urinary function  Description  INTERVENTIONS:  - Assess urinary function  - Encourage oral fluids to ensure adequate hydration  - Administer IV fluids as ordered to ensure adequate hydration  - Administer ordered medications as needed  - Offer frequent toileting  - Follow urinary retention protocol if ordered  Outcome: Progressing  Goal: Absence of urinary retention  Description  INTERVENTIONS:  - Assess patient?s ability to void and empty bladder  - Monitor I/O  - Bladder scan as needed  - Discuss with physician/AP medications to alleviate retention as needed  - Discuss catheterization for long term situations as appropriate  Outcome: Progressing  Goal: Urinary catheter remains patent  Description  INTERVENTIONS:  - Assess patency of urinary catheter  - If patient has a chronic gao, consider changing catheter if non-functioning  - Follow guidelines for intermittent irrigation of non-functioning urinary catheter  Outcome: Progressing     Problem: SAFETY ADULT  Goal: Patient will remain free of falls  Description  INTERVENTIONS:  - Assess patient frequently for physical needs  -  Identify cognitive and physical deficits and behaviors that affect risk of falls    -  [place] fall precautions as indicated by assessment   - Educate patient/family on patient safety including physical limitations  - Instruct patient to call for assistance with activity based on assessment  - Modify environment to reduce risk of injury  - Consider OT/PT consult to assist with strengthening/mobility  Outcome: Progressing     Problem: NEUROSENSORY - ADULT  Goal: Achieves stable or improved neurological status  Description  INTERVENTIONS  - Monitor and report changes in neurological status  - Initiate measures to prevent increased intracranial pressure  - Maintain blood pressure and fluid volume within ordered parameters to optimize cerebral perfusion  - Monitor temperature, glucose, and sodium or any other associated labs  Initiate appropriate interventions as ordered  - Monitor for seizure activity   - Administer anti-seizure medications as ordered  Outcome: Progressing     Problem: CARDIOVASCULAR - ADULT  Goal: Maintains optimal cardiac output and hemodynamic stability  Description  INTERVENTIONS:  - Monitor I/O, vital signs and rhythm  - Monitor for S/S and trends of decreased cardiac output i e  bleeding, hypotension  - Administer and titrate ordered vasoactive medications to optimize hemodynamic stability  - Assess quality of pulses, skin color and temperature  - Assess for signs of decreased coronary artery perfusion - ex   Angina  - Instruct patient to report change in severity of symptoms  Outcome: Progressing     Problem: METABOLIC, FLUID AND ELECTROLYTES - ADULT  Goal: Electrolytes maintained within normal limits  Description  INTERVENTIONS:  - Monitor labs and assess patient for signs and symptoms of electrolyte imbalances  - Administer electrolyte replacement as ordered  - Monitor response to electrolyte replacements, including repeat lab results as appropriate  - Instruct patient on fluid and nutrition as appropriate  Outcome: Progressing  Goal: Glucose maintained within target range  Description  INTERVENTIONS:  - Monitor Blood Glucose as ordered  - Assess for signs and symptoms of hyperglycemia and hypoglycemia  - Administer ordered medications to maintain glucose within target range  - Assess nutritional intake and initiate nutrition service referral as needed  Outcome: Progressing     Problem: SKIN/TISSUE INTEGRITY - ADULT  Goal: Skin integrity remains intact  Description  INTERVENTIONS  - Identify patients at risk for skin breakdown  - Assess and monitor skin integrity  - Assess and monitor nutrition and hydration status  - Monitor labs (i e  albumin)  - Assess for incontinence   - Turn and reposition patient  - Assist with mobility/ambulation  - Relieve pressure over bony prominences  - Avoid friction and shearing  - Provide appropriate hygiene as needed including keeping skin clean and dry  - Evaluate need for skin moisturizer/barrier cream  - Collaborate with interdisciplinary team (i e  Nutrition, Rehabilitation, etc )   - Patient/family teaching  Outcome: Progressing     Problem: Nutrition/Hydration-ADULT  Goal: Nutrient/Hydration intake appropriate for improving, restoring or maintaining nutritional needs  Description  Monitor and assess patient's nutrition/hydration status for malnutrition (ex- brittle hair, bruises, dry skin, pale skin and conjunctiva, muscle wasting, smooth red tongue, and disorientation)  Collaborate with interdisciplinary team and initiate plan and interventions as ordered  Monitor patient's weight and dietary intake as ordered or per policy  Utilize nutrition screening tool and intervene per policy  Determine patient's food preferences and provide high-protein, high-caloric foods as appropriate       INTERVENTIONS:  - Monitor oral intake, urinary output, labs, and treatment plans  - Assess nutrition and hydration status and recommend course of action  - Evaluate amount of meals eaten  - Assist patient with eating if necessary   - Allow adequate time for meals  - Recommend/ encourage appropriate diets, oral nutritional supplements, and vitamin/mineral supplements  - Order, calculate, and assess calorie counts as needed  - Recommend, monitor, and adjust tube feedings and TPN/PPN based on assessed needs  - Assess need for intravenous fluids  - Provide specific nutrition/hydration education as appropriate  - Include patient/family/caregiver in decisions related to nutrition  Outcome: Progressing

## 2019-03-03 NOTE — SPEECH THERAPY NOTE
Speech Language/Pathology      SLP arrived to room in attempt for speech/swallow therapy session  Pt was sleeping in bed  Despite verbal cues and tactile cues on hands/arms pt would not wake up-light snoring  Nurse reported pt has not slept all night and likely needs to sleep  Will f/u tomorrow as able, as appropriate

## 2019-03-03 NOTE — NURSING NOTE
Patient refused 2300 routine vital signs   Shouted at tech "don't bother me with vitals, get out now "

## 2019-03-04 LAB
ANION GAP SERPL CALCULATED.3IONS-SCNC: 5 MMOL/L (ref 4–13)
BUN SERPL-MCNC: 20 MG/DL (ref 5–25)
CALCIUM SERPL-MCNC: 8.4 MG/DL (ref 8.3–10.1)
CHLORIDE SERPL-SCNC: 104 MMOL/L (ref 100–108)
CO2 SERPL-SCNC: 33 MMOL/L (ref 21–32)
CREAT SERPL-MCNC: 0.73 MG/DL (ref 0.6–1.3)
ERYTHROCYTE [DISTWIDTH] IN BLOOD BY AUTOMATED COUNT: 13.8 % (ref 11.6–15.1)
GFR SERPL CREATININE-BSD FRML MDRD: 89 ML/MIN/1.73SQ M
GLUCOSE SERPL-MCNC: 130 MG/DL (ref 65–140)
GLUCOSE SERPL-MCNC: 132 MG/DL (ref 65–140)
GLUCOSE SERPL-MCNC: 92 MG/DL (ref 65–140)
GLUCOSE SERPL-MCNC: 92 MG/DL (ref 65–140)
GLUCOSE SERPL-MCNC: 96 MG/DL (ref 65–140)
HCT VFR BLD AUTO: 33.5 % (ref 36.5–49.3)
HGB BLD-MCNC: 10.5 G/DL (ref 12–17)
MCH RBC QN AUTO: 29.9 PG (ref 26.8–34.3)
MCHC RBC AUTO-ENTMCNC: 31.3 G/DL (ref 31.4–37.4)
MCV RBC AUTO: 95 FL (ref 82–98)
PLATELET # BLD AUTO: 184 THOUSANDS/UL (ref 149–390)
PMV BLD AUTO: 9.5 FL (ref 8.9–12.7)
POTASSIUM SERPL-SCNC: 4 MMOL/L (ref 3.5–5.3)
RBC # BLD AUTO: 3.51 MILLION/UL (ref 3.88–5.62)
SODIUM SERPL-SCNC: 142 MMOL/L (ref 136–145)
WBC # BLD AUTO: 6.35 THOUSAND/UL (ref 4.31–10.16)

## 2019-03-04 PROCEDURE — 99232 SBSQ HOSP IP/OBS MODERATE 35: CPT | Performed by: INTERNAL MEDICINE

## 2019-03-04 PROCEDURE — 85027 COMPLETE CBC AUTOMATED: CPT | Performed by: INTERNAL MEDICINE

## 2019-03-04 PROCEDURE — 92526 ORAL FUNCTION THERAPY: CPT

## 2019-03-04 PROCEDURE — 94640 AIRWAY INHALATION TREATMENT: CPT

## 2019-03-04 PROCEDURE — 82948 REAGENT STRIP/BLOOD GLUCOSE: CPT

## 2019-03-04 PROCEDURE — 94760 N-INVAS EAR/PLS OXIMETRY 1: CPT

## 2019-03-04 PROCEDURE — 80048 BASIC METABOLIC PNL TOTAL CA: CPT | Performed by: INTERNAL MEDICINE

## 2019-03-04 RX ORDER — CEFUROXIME AXETIL 250 MG/1
500 TABLET ORAL EVERY 12 HOURS SCHEDULED
Status: DISCONTINUED | OUTPATIENT
Start: 2019-03-04 | End: 2019-03-05 | Stop reason: HOSPADM

## 2019-03-04 RX ORDER — CEFUROXIME AXETIL 250 MG/1
500 TABLET ORAL EVERY 12 HOURS SCHEDULED
Status: DISCONTINUED | OUTPATIENT
Start: 2019-03-04 | End: 2019-03-04

## 2019-03-04 RX ADMIN — PREDNISONE 40 MG: 20 TABLET ORAL at 08:37

## 2019-03-04 RX ADMIN — BUDESONIDE 0.5 MG: 0.5 INHALANT RESPIRATORY (INHALATION) at 08:28

## 2019-03-04 RX ADMIN — GABAPENTIN 100 MG: 100 CAPSULE ORAL at 17:18

## 2019-03-04 RX ADMIN — GABAPENTIN 100 MG: 100 CAPSULE ORAL at 08:38

## 2019-03-04 RX ADMIN — IPRATROPIUM BROMIDE 0.5 MG: 0.5 SOLUTION RESPIRATORY (INHALATION) at 08:28

## 2019-03-04 RX ADMIN — CHLORHEXIDINE GLUCONATE 15 ML: 1.2 RINSE ORAL at 21:26

## 2019-03-04 RX ADMIN — APIXABAN 5 MG: 5 TABLET, FILM COATED ORAL at 17:18

## 2019-03-04 RX ADMIN — APIXABAN 5 MG: 5 TABLET, FILM COATED ORAL at 08:37

## 2019-03-04 RX ADMIN — CHLORHEXIDINE GLUCONATE 15 ML: 1.2 RINSE ORAL at 08:38

## 2019-03-04 RX ADMIN — CEFUROXIME AXETIL 500 MG: 250 TABLET ORAL at 13:26

## 2019-03-04 RX ADMIN — LEVOTHYROXINE SODIUM 200 MCG: 100 TABLET ORAL at 05:37

## 2019-03-04 RX ADMIN — DILTIAZEM HYDROCHLORIDE 120 MG: 120 CAPSULE, COATED, EXTENDED RELEASE ORAL at 10:00

## 2019-03-04 RX ADMIN — TRAMADOL HYDROCHLORIDE 50 MG: 50 TABLET, COATED ORAL at 08:37

## 2019-03-04 RX ADMIN — LEVALBUTEROL 1.25 MG: 1.25 SOLUTION, CONCENTRATE RESPIRATORY (INHALATION) at 08:28

## 2019-03-04 RX ADMIN — CEFUROXIME AXETIL 500 MG: 250 TABLET ORAL at 21:26

## 2019-03-04 RX ADMIN — TRAMADOL HYDROCHLORIDE 50 MG: 50 TABLET, COATED ORAL at 21:27

## 2019-03-04 RX ADMIN — LEVETIRACETAM 500 MG: 500 TABLET, FILM COATED ORAL at 21:28

## 2019-03-04 RX ADMIN — OXCARBAZEPINE 300 MG: 300 TABLET, FILM COATED ORAL at 21:26

## 2019-03-04 RX ADMIN — LEVETIRACETAM 500 MG: 500 TABLET, FILM COATED ORAL at 08:38

## 2019-03-04 NOTE — SOCIAL WORK
Pt admitted from [facility] with respiratory failure now on O2 @ 3Lnc (baseline)  Pt is an MA bed hold per their intake team   Pt w/ dementia having a mental health hx of schizoaffective d/c, Bipolar d/c last having IP psych admission 12/2018  POLST form on record (in media of chart)  Per pt has a h/o being at [facility] from [id number] as well as being in an institution for criminally insane patients because he threatened to kill a Dr (unknown if this is fact vs fiction)  Pt states he uses a wheelchair most of the day using a walker to transfer to the chair  He is minimal asst with ADL's  PCP is Dr Howard Nolasco and meds provided by SNF  Transportation provided by [facility]  Per attending pt will be medically cleared for d/c Tuesday with transportation arranged via [address] [place] [address]  at [address] [place] [address] ,   URMILA RN, Attending, pt and pt's sister made aware of same  ALICE#2 signed and to be scanned into EHR  No further d/c needs identified at this time

## 2019-03-04 NOTE — PLAN OF CARE
Problem: Potential for Falls  Goal: Patient will remain free of falls  Description  INTERVENTIONS:  - Assess patient frequently for physical needs  -  Identify cognitive and physical deficits and behaviors that affect risk of falls    -  [place] fall precautions as indicated by assessment   - Educate patient/family on patient safety including physical limitations  - Instruct patient to call for assistance with activity based on assessment  - Modify environment to reduce risk of injury  - Consider OT/PT consult to assist with strengthening/mobility  Outcome: Progressing     Problem: Prexisting or High Potential for Compromised Skin Integrity  Goal: Skin integrity is maintained or improved  Description  INTERVENTIONS:  - Identify patients at risk for skin breakdown  - Assess and monitor skin integrity  - Assess and monitor nutrition and hydration status  - Monitor labs (i e  albumin)  - Assess for incontinence   - Turn and reposition patient  - Assist with mobility/ambulation  - Relieve pressure over bony prominences  - Avoid friction and shearing  - Provide appropriate hygiene as needed including keeping skin clean and dry  - Evaluate need for skin moisturizer/barrier cream  - Collaborate with interdisciplinary team (i e  Nutrition, Rehabilitation, etc )   - Patient/family teaching  Outcome: Progressing     Problem: PAIN - ADULT  Goal: Verbalizes/displays adequate comfort level or baseline comfort level  Description  Interventions:  - Encourage patient to monitor pain and request assistance  - Assess pain using appropriate pain scale  - Administer analgesics based on type and severity of pain and evaluate response  - Implement non-pharmacological measures as appropriate and evaluate response  - Consider cultural and social influences on pain and pain management  - Notify physician/advanced practitioner if interventions unsuccessful or patient reports new pain  Outcome: Progressing     Problem: INFECTION - ADULT  Goal: Absence or prevention of progression during hospitalization  Description  INTERVENTIONS:  - Assess and monitor for signs and symptoms of infection  - Monitor lab/diagnostic results  - Monitor all insertion sites, i e  indwelling lines, tubes, and drains  - Monitor endotracheal (as able) and nasal secretions for changes in amount and color  - [place] appropriate cooling/warming therapies per order  - Administer medications as ordered  - Instruct and encourage patient and family to use good hand hygiene technique  - Identify and instruct in appropriate isolation precautions for identified infection/condition  Outcome: Progressing     Problem: DISCHARGE PLANNING  Goal: Discharge to home or other facility with appropriate resources  Description  INTERVENTIONS:  - Identify barriers to discharge w/patient and caregiver  - Arrange for needed discharge resources and transportation as appropriate  - Identify discharge learning needs (meds, wound care, etc )  - Arrange for interpretive services to assist at discharge as needed  - Refer to Case Management Department for coordinating discharge planning if the patient needs post-hospital services based on physician/advanced practitioner order or complex needs related to functional status, cognitive ability, or social support system  Outcome: Progressing     Problem: Knowledge Deficit  Goal: Patient/family/caregiver demonstrates understanding of disease process, treatment plan, medications, and discharge instructions  Description  Complete learning assessment and assess knowledge base    Interventions:  - Provide teaching at level of understanding  - Provide teaching via preferred learning methods  Outcome: Progressing     Problem: RESPIRATORY - ADULT  Goal: Achieves optimal ventilation and oxygenation  Description  INTERVENTIONS:  - Assess for changes in respiratory status  - Assess for changes in mentation and behavior  - Position to facilitate oxygenation and minimize respiratory effort  - Oxygen administration by appropriate delivery method based on oxygen saturation (per order) or ABGs  - Initiate smoking cessation education as indicated  - Encourage broncho-pulmonary hygiene including cough, deep breathe, Incentive Spirometry  - Assess the need for suctioning and aspirate as needed  - Assess and instruct to report SOB or any respiratory difficulty  - Respiratory Therapy support as indicated  Outcome: Progressing     Problem: GENITOURINARY - ADULT  Goal: Maintains or returns to baseline urinary function  Description  INTERVENTIONS:  - Assess urinary function  - Encourage oral fluids to ensure adequate hydration  - Administer IV fluids as ordered to ensure adequate hydration  - Administer ordered medications as needed  - Offer frequent toileting  - Follow urinary retention protocol if ordered  Outcome: Progressing  Goal: Absence of urinary retention  Description  INTERVENTIONS:  - Assess patient?s ability to void and empty bladder  - Monitor I/O  - Bladder scan as needed  - Discuss with physician/AP medications to alleviate retention as needed  - Discuss catheterization for long term situations as appropriate  Outcome: Progressing  Goal: Urinary catheter remains patent  Description  INTERVENTIONS:  - Assess patency of urinary catheter  - If patient has a chronic gao, consider changing catheter if non-functioning  - Follow guidelines for intermittent irrigation of non-functioning urinary catheter  Outcome: Progressing     Problem: SAFETY ADULT  Goal: Patient will remain free of falls  Description  INTERVENTIONS:  - Assess patient frequently for physical needs  -  Identify cognitive and physical deficits and behaviors that affect risk of falls    -  [place] fall precautions as indicated by assessment   - Educate patient/family on patient safety including physical limitations  - Instruct patient to call for assistance with activity based on assessment  - Modify environment to reduce risk of injury  - Consider OT/PT consult to assist with strengthening/mobility  Outcome: Progressing     Problem: NEUROSENSORY - ADULT  Goal: Achieves stable or improved neurological status  Description  INTERVENTIONS  - Monitor and report changes in neurological status  - Initiate measures to prevent increased intracranial pressure  - Maintain blood pressure and fluid volume within ordered parameters to optimize cerebral perfusion  - Monitor temperature, glucose, and sodium or any other associated labs  Initiate appropriate interventions as ordered  - Monitor for seizure activity   - Administer anti-seizure medications as ordered  Outcome: Progressing     Problem: CARDIOVASCULAR - ADULT  Goal: Maintains optimal cardiac output and hemodynamic stability  Description  INTERVENTIONS:  - Monitor I/O, vital signs and rhythm  - Monitor for S/S and trends of decreased cardiac output i e  bleeding, hypotension  - Administer and titrate ordered vasoactive medications to optimize hemodynamic stability  - Assess quality of pulses, skin color and temperature  - Assess for signs of decreased coronary artery perfusion - ex   Angina  - Instruct patient to report change in severity of symptoms  Outcome: Progressing     Problem: METABOLIC, FLUID AND ELECTROLYTES - ADULT  Goal: Electrolytes maintained within normal limits  Description  INTERVENTIONS:  - Monitor labs and assess patient for signs and symptoms of electrolyte imbalances  - Administer electrolyte replacement as ordered  - Monitor response to electrolyte replacements, including repeat lab results as appropriate  - Instruct patient on fluid and nutrition as appropriate  Outcome: Progressing  Goal: Glucose maintained within target range  Description  INTERVENTIONS:  - Monitor Blood Glucose as ordered  - Assess for signs and symptoms of hyperglycemia and hypoglycemia  - Administer ordered medications to maintain glucose within target range  - Assess nutritional intake and initiate nutrition service referral as needed  Outcome: Progressing     Problem: SKIN/TISSUE INTEGRITY - ADULT  Goal: Skin integrity remains intact  Description  INTERVENTIONS  - Identify patients at risk for skin breakdown  - Assess and monitor skin integrity  - Assess and monitor nutrition and hydration status  - Monitor labs (i e  albumin)  - Assess for incontinence   - Turn and reposition patient  - Assist with mobility/ambulation  - Relieve pressure over bony prominences  - Avoid friction and shearing  - Provide appropriate hygiene as needed including keeping skin clean and dry  - Evaluate need for skin moisturizer/barrier cream  - Collaborate with interdisciplinary team (i e  Nutrition, Rehabilitation, etc )   - Patient/family teaching  Outcome: Progressing     Problem: Nutrition/Hydration-ADULT  Goal: Nutrient/Hydration intake appropriate for improving, restoring or maintaining nutritional needs  Description  Monitor and assess patient's nutrition/hydration status for malnutrition (ex- brittle hair, bruises, dry skin, pale skin and conjunctiva, muscle wasting, smooth red tongue, and disorientation)  Collaborate with interdisciplinary team and initiate plan and interventions as ordered  Monitor patient's weight and dietary intake as ordered or per policy  Utilize nutrition screening tool and intervene per policy  Determine patient's food preferences and provide high-protein, high-caloric foods as appropriate       INTERVENTIONS:  - Monitor oral intake, urinary output, labs, and treatment plans  - Assess nutrition and hydration status and recommend course of action  - Evaluate amount of meals eaten  - Assist patient with eating if necessary   - Allow adequate time for meals  - Recommend/ encourage appropriate diets, oral nutritional supplements, and vitamin/mineral supplements  - Order, calculate, and assess calorie counts as needed  - Recommend, monitor, and adjust tube feedings and TPN/PPN based on assessed needs  - Assess need for intravenous fluids  - Provide specific nutrition/hydration education as appropriate  - Include patient/family/caregiver in decisions related to nutrition  Outcome: Progressing

## 2019-03-04 NOTE — TELEPHONE ENCOUNTER
Patient is still inpt, however, can you please find an appointment with one of the MD's and I can coordinate cystourethrogram around that appointment once given   Thank you!!

## 2019-03-04 NOTE — PLAN OF CARE
Problem: Potential for Falls  Goal: Patient will remain free of falls  Description  INTERVENTIONS:  - Assess patient frequently for physical needs  -  Identify cognitive and physical deficits and behaviors that affect risk of falls    -  [place] fall precautions as indicated by assessment   - Educate patient/family on patient safety including physical limitations  - Instruct patient to call for assistance with activity based on assessment  - Modify environment to reduce risk of injury  - Consider OT/PT consult to assist with strengthening/mobility  Outcome: Progressing     Problem: Prexisting or High Potential for Compromised Skin Integrity  Goal: Skin integrity is maintained or improved  Description  INTERVENTIONS:  - Identify patients at risk for skin breakdown  - Assess and monitor skin integrity  - Assess and monitor nutrition and hydration status  - Monitor labs (i e  albumin)  - Assess for incontinence   - Turn and reposition patient  - Assist with mobility/ambulation  - Relieve pressure over bony prominences  - Avoid friction and shearing  - Provide appropriate hygiene as needed including keeping skin clean and dry  - Evaluate need for skin moisturizer/barrier cream  - Collaborate with interdisciplinary team (i e  Nutrition, Rehabilitation, etc )   - Patient/family teaching  Outcome: Progressing     Problem: PAIN - ADULT  Goal: Verbalizes/displays adequate comfort level or baseline comfort level  Description  Interventions:  - Encourage patient to monitor pain and request assistance  - Assess pain using appropriate pain scale  - Administer analgesics based on type and severity of pain and evaluate response  - Implement non-pharmacological measures as appropriate and evaluate response  - Consider cultural and social influences on pain and pain management  - Notify physician/advanced practitioner if interventions unsuccessful or patient reports new pain  Outcome: Progressing     Problem: INFECTION - ADULT  Goal: Absence or prevention of progression during hospitalization  Description  INTERVENTIONS:  - Assess and monitor for signs and symptoms of infection  - Monitor lab/diagnostic results  - Monitor all insertion sites, i e  indwelling lines, tubes, and drains  - Monitor endotracheal (as able) and nasal secretions for changes in amount and color  - [place] appropriate cooling/warming therapies per order  - Administer medications as ordered  - Instruct and encourage patient and family to use good hand hygiene technique  - Identify and instruct in appropriate isolation precautions for identified infection/condition  Outcome: Progressing     Problem: DISCHARGE PLANNING  Goal: Discharge to home or other facility with appropriate resources  Description  INTERVENTIONS:  - Identify barriers to discharge w/patient and caregiver  - Arrange for needed discharge resources and transportation as appropriate  - Identify discharge learning needs (meds, wound care, etc )  - Arrange for interpretive services to assist at discharge as needed  - Refer to Case Management Department for coordinating discharge planning if the patient needs post-hospital services based on physician/advanced practitioner order or complex needs related to functional status, cognitive ability, or social support system  Outcome: Progressing     Problem: Knowledge Deficit  Goal: Patient/family/caregiver demonstrates understanding of disease process, treatment plan, medications, and discharge instructions  Description  Complete learning assessment and assess knowledge base    Interventions:  - Provide teaching at level of understanding  - Provide teaching via preferred learning methods  Outcome: Progressing     Problem: RESPIRATORY - ADULT  Goal: Achieves optimal ventilation and oxygenation  Description  INTERVENTIONS:  - Assess for changes in respiratory status  - Assess for changes in mentation and behavior  - Position to facilitate oxygenation and minimize respiratory effort  - Oxygen administration by appropriate delivery method based on oxygen saturation (per order) or ABGs  - Initiate smoking cessation education as indicated  - Encourage broncho-pulmonary hygiene including cough, deep breathe, Incentive Spirometry  - Assess the need for suctioning and aspirate as needed  - Assess and instruct to report SOB or any respiratory difficulty  - Respiratory Therapy support as indicated  Outcome: Progressing     Problem: GENITOURINARY - ADULT  Goal: Maintains or returns to baseline urinary function  Description  INTERVENTIONS:  - Assess urinary function  - Encourage oral fluids to ensure adequate hydration  - Administer IV fluids as ordered to ensure adequate hydration  - Administer ordered medications as needed  - Offer frequent toileting  - Follow urinary retention protocol if ordered  Outcome: Progressing  Goal: Absence of urinary retention  Description  INTERVENTIONS:  - Assess patient?s ability to void and empty bladder  - Monitor I/O  - Bladder scan as needed  - Discuss with physician/AP medications to alleviate retention as needed  - Discuss catheterization for long term situations as appropriate  Outcome: Progressing  Goal: Urinary catheter remains patent  Description  INTERVENTIONS:  - Assess patency of urinary catheter  - If patient has a chronic gao, consider changing catheter if non-functioning  - Follow guidelines for intermittent irrigation of non-functioning urinary catheter  Outcome: Progressing     Problem: SAFETY ADULT  Goal: Patient will remain free of falls  Description  INTERVENTIONS:  - Assess patient frequently for physical needs  -  Identify cognitive and physical deficits and behaviors that affect risk of falls    -  [place] fall precautions as indicated by assessment   - Educate patient/family on patient safety including physical limitations  - Instruct patient to call for assistance with activity based on assessment  - Modify environment to reduce risk of injury  - Consider OT/PT consult to assist with strengthening/mobility  Outcome: Progressing     Problem: NEUROSENSORY - ADULT  Goal: Achieves stable or improved neurological status  Description  INTERVENTIONS  - Monitor and report changes in neurological status  - Initiate measures to prevent increased intracranial pressure  - Maintain blood pressure and fluid volume within ordered parameters to optimize cerebral perfusion  - Monitor temperature, glucose, and sodium or any other associated labs  Initiate appropriate interventions as ordered  - Monitor for seizure activity   - Administer anti-seizure medications as ordered  Outcome: Progressing     Problem: CARDIOVASCULAR - ADULT  Goal: Maintains optimal cardiac output and hemodynamic stability  Description  INTERVENTIONS:  - Monitor I/O, vital signs and rhythm  - Monitor for S/S and trends of decreased cardiac output i e  bleeding, hypotension  - Administer and titrate ordered vasoactive medications to optimize hemodynamic stability  - Assess quality of pulses, skin color and temperature  - Assess for signs of decreased coronary artery perfusion - ex   Angina  - Instruct patient to report change in severity of symptoms  Outcome: Progressing     Problem: METABOLIC, FLUID AND ELECTROLYTES - ADULT  Goal: Electrolytes maintained within normal limits  Description  INTERVENTIONS:  - Monitor labs and assess patient for signs and symptoms of electrolyte imbalances  - Administer electrolyte replacement as ordered  - Monitor response to electrolyte replacements, including repeat lab results as appropriate  - Instruct patient on fluid and nutrition as appropriate  Outcome: Progressing  Goal: Glucose maintained within target range  Description  INTERVENTIONS:  - Monitor Blood Glucose as ordered  - Assess for signs and symptoms of hyperglycemia and hypoglycemia  - Administer ordered medications to maintain glucose within target range  - Assess nutritional intake and initiate nutrition service referral as needed  Outcome: Progressing     Problem: SKIN/TISSUE INTEGRITY - ADULT  Goal: Skin integrity remains intact  Description  INTERVENTIONS  - Identify patients at risk for skin breakdown  - Assess and monitor skin integrity  - Assess and monitor nutrition and hydration status  - Monitor labs (i e  albumin)  - Assess for incontinence   - Turn and reposition patient  - Assist with mobility/ambulation  - Relieve pressure over bony prominences  - Avoid friction and shearing  - Provide appropriate hygiene as needed including keeping skin clean and dry  - Evaluate need for skin moisturizer/barrier cream  - Collaborate with interdisciplinary team (i e  Nutrition, Rehabilitation, etc )   - Patient/family teaching  Outcome: Progressing     Problem: Nutrition/Hydration-ADULT  Goal: Nutrient/Hydration intake appropriate for improving, restoring or maintaining nutritional needs  Description  Monitor and assess patient's nutrition/hydration status for malnutrition (ex- brittle hair, bruises, dry skin, pale skin and conjunctiva, muscle wasting, smooth red tongue, and disorientation)  Collaborate with interdisciplinary team and initiate plan and interventions as ordered  Monitor patient's weight and dietary intake as ordered or per policy  Utilize nutrition screening tool and intervene per policy  Determine patient's food preferences and provide high-protein, high-caloric foods as appropriate       INTERVENTIONS:  - Monitor oral intake, urinary output, labs, and treatment plans  - Assess nutrition and hydration status and recommend course of action  - Evaluate amount of meals eaten  - Assist patient with eating if necessary   - Allow adequate time for meals  - Recommend/ encourage appropriate diets, oral nutritional supplements, and vitamin/mineral supplements  - Order, calculate, and assess calorie counts as needed  - Recommend, monitor, and adjust tube feedings and TPN/PPN based on assessed needs  - Assess need for intravenous fluids  - Provide specific nutrition/hydration education as appropriate  - Include patient/family/caregiver in decisions related to nutrition  Outcome: Progressing

## 2019-03-04 NOTE — PLAN OF CARE
Problem: DISCHARGE PLANNING - CARE MANAGEMENT  Goal: Discharge to post-acute care or home with appropriate resources  Description  INTERVENTIONS:  - Conduct assessment to determine patient/family and health care team treatment goals, and need for post-acute services based on payer coverage, community resources, and patient preferences, and barriers to discharge  - Address psychosocial, clinical, and financial barriers to discharge as identified in assessment in conjunction with the patient/family and health care team  - Arrange appropriate level of post-acute services according to patient?s   needs and preference and payer coverage in collaboration with the physician and health care team  - Communicate with and update the patient/family, physician, and health care team regarding progress on the discharge plan  - Arrange appropriate transportation to post-acute venues  -CM to facilitate return to [facility] when medically cleared  -CM to continue to follow throughout hospitalization to assist with dc plan of care  Outcome: Progressing

## 2019-03-04 NOTE — SPEECH THERAPY NOTE
Speech Language/Pathology    Speech/Language Pathology Progress Note    Patient Name: Allison Kerns  OJWTN'E Date: 3/4/2019     Problem List  Patient Active Problem List   Diagnosis    [facility] Gu[facility]man [facility]    Acute respiratory failure ([facility] )    COPD (chronic obstructive pulmonary disease) ([facility]    Pneumonia    Hypothyroid    [facility]    Respiratory arrest ([facility] )        Past Medical History  Past Medical History:   Diagnosis Date    Acute respiratory failure with hypoxia ([facility]     Anemia     Atrial fibrillation (HCC)     Benign prostatic hyperplasia without lower urinary tract symptoms     Cardiac disease     Convulsion (HCC)     COPD [facility] disease) [facility]     Dementia     Disease of thyroid gland     Encephalopathy     GERD (gastroesophageal reflux disease)     Heart failure (HCC)     Hyperlipidemia     Hypo-osmolality and hyponatremia     Hypothyroidism     Peripheral vascular disease ([facility]     Psychiatric disorder     schizoaffective    Pulmonary collapse         Past Surgical History  No past surgical history on file  Subjective:  "i'm  Not drinking that thick and easy!"  Objective:  Pt seen at lunch for tolerance and potential upgrade  Ordered pt fish, salad (at his request and then not given due to his mastication), mashed potatoes, daisy food cake, thin milk and water w/ pills  Pt placed food back farther in his mouth  Cued to keep it up front more so that he could chew it better  Tolerated fish wfl  Initially tolerated thin liquids w/out overt s/s  Cough following neck extension x 1  Delayed cough x 1  Belches often but this may be due to him swallowing air and not necessarily a GI issue  Assessment:  Pt  Showing inconsistent tolerance for thin liquids  Mastication wfl for only soft foods at this time  Plan/Recommendations:  Upgrade to dysphagia 2 mechanical soft and nectar thick liquids   Allow small sips of thin water w/ supervision only

## 2019-03-04 NOTE — OCCUPATIONAL THERAPY NOTE
Occupational Therapy         Patient Name: Kateri Crigler  JYJYQ'H Date: 3/4/2019    MD's orders received  Per Catrachita SHIRLEY, pt is a bed hold and plans to be discharged tomorrow  PT/OT notes not required to return to his LTC facility  Acute OT tx not indicated at this time   Lizzette Garcia, OT

## 2019-03-04 NOTE — UTILIZATION REVIEW
Continued Stay Review    Date:   3/4/2019    Vital Signs: /75 (BP Location: Left arm)   Pulse 71   Temp 97 8 °F (36 6 °C) (Temporal)   Resp 18   Wt 115 kg (253 lb 8 5 oz)   SpO2 94%      Assessment/Plan:   65 Yo male initially admitted with  Acute hypoxic respiratory failure multifactorial due to pneumonia and copd with acute exacerbation,  Extubated 3/1  Reports some SOB  Continues on O2 @ 5 liters with sat 94%  Started Prednisone taper today  Continue Nebs tx  tid      Medications:   Scheduled Meds:   Current Facility-Administered Medications:  albuterol 2 5 mg Nebulization Q6H PRN    apixaban 5 mg Oral BID    budesonide 0 5 mg Nebulization Q12H    Ceftriaxone 1000 mg IV Qd    chlorhexidine 15 mL Swish & Spit Q12H [address]    diltiazem 120 mg Oral Daily    gabapentin 100 mg Oral BID    insulin lispro 1-5 Units Subcutaneous HS    insulin lispro 1-6 Units Subcutaneous Q6H [address]    insulin lispro 1-6 Units Subcutaneous TID AC    ipratropium 0 5 mg Nebulization TID    levalbuterol 1 25 mg Nebulization TID    levETIRAcetam 500 mg Oral Q12H THIERRY    levothyroxine 200 mcg Oral Early Morning    OXcarbazepine 300 mg Oral HS    predniSONE 40 mg Oral Daily    traMADol 50 mg Oral BID      Pertinent Labs/Diagnostic Results:   CO2  33  H&H 10 5 / 33 5,   Wbc's 6 35  Procalcitonin on 3/3  = 6 76    Age/Sex: 66 y o  male   Discharge Plan:  TBD      Network Utilization Review Department  Phone: [phone]; Fax [phone]  [email or web address]  ATTENTION: Please call with any questions or concerns to [phone]  and carefully listen to the prompts so that you are directed to the right person  Send all requests for admission clinical reviews, approved or denied determinations and any other requests to fax [phone]   All voicemails are confidential

## 2019-03-04 NOTE — PROGRESS NOTES
Progress Note - Shannon Linder 66 y o  male MRN: [id number]    Unit/Bed#: [facility] 2 -01 Encounter: [id number]      Subjective: The patient offers no complaint  He has no chest pain or shortness of breath  He denies nausea or vomiting  Physical Exam:   Temp:  [97 5 °F (36 4 °C)-97 8 °F (36 6 °C)] 97 6 °F (36 4 °C)  HR:  [62-80] 80  Resp:  [18-20] 20  BP: (132-146)/(61-75) 141/67    Gen:  Well-developed, obese, in no distress  Neck:  Supple  No lymphadenopathy, goiter, or bruit  Heart:  Regular rhythm  No murmur, gallop, or rub  Lungs:  Diminished breath sounds bilaterally  No wheezing, rales, or rhonchi    Abd:  Soft with active bowel sounds  No mass, tenderness, or organomegaly  Extremities:  No clubbing, cyanosis, or edema  No calf tenderness  Neuro:  Alert and conversant  No focal sign  Skin:  Warm and dry      LABS:   CBC:   Lab Results   Component Value Date    WBC 6 35 03/04/2019    HGB 10 5 (L) 03/04/2019    HCT 33 5 (L) 03/04/2019    MCV 95 03/04/2019     03/04/2019    MCH 29 9 03/04/2019    MCHC 31 3 (L) 03/04/2019    RDW 13 8 03/04/2019    MPV 9 5 03/04/2019   , CMP:   Lab Results   Component Value Date    SODIUM 142 03/04/2019    K 4 0 03/04/2019     03/04/2019    CO2 33 (H) 03/04/2019    BUN 20 03/04/2019    CREATININE 0 73 03/04/2019    CALCIUM 8 4 03/04/2019    EGFR 89 03/04/2019           Patient Active Problem List   Diagnosis    [facility] Gu[facility]man [facility]    Acute respiratory failure (HCC)    COPD [facility]chronic obstructive pulmonary disease) [facility]    Pneumonia    Hypothyroid    Atrial fibrillation [facility]    Respiratory arrest ([facility] )       Assessment/Plan:  1  Pneumonia  2  Sepsis secondary to 1   3  Acute hypoxic respiratory failure secondary to 1   4  COPD   5  Atrial fibrillation  6  Hypothyroidism  7  Acute metabolic encephalopathy secondary to 1 , improved  8  Status post traumatic Guzman catheterization, now with suprapubic catheter  9   Anemia      The patient is much improved  He is now on oral antibiotics and oral steroids  I am hopeful that he will be suitable for transfer to rehab tomorrow      VTE Pharmacologic Prophylaxis:  Eliquis  VTE Mechanical Prophylaxis: reason for no mechanical VTE prophylaxis Therapeutically anticoagulated

## 2019-03-04 NOTE — PROGRESS NOTES
Progress Note - Pulmonary   Karen Andrew 66 y o  male MRN: [id number]  Unit/Bed#: [facility] 2 -01 Encounter: [id number]      Assessment/Plan:    1  Acute on chronic hypoxic respiratory failure s/p cardiac arrest  1  Titrate oxygen to maintain SpO2>/= 88%, currently oxygenating well with SpO2 92-94% on 3LNC baseline requirements   2  Pulmonary toilet: IS, cough deep breath  2  COPD of unknown severity with acute exacerbation-improving  1  Prednisone taper started today 40 mg daily with reduction by10 mg every 3 days  2   continue Xopenex/Atrovent t i d    3  Continue budesonide b i d   4  Outpatient Pulmonary follow-up and [facility]  3  PNA/HCAP with Streptococcus antigens with bacteremia  1  Currently on ceftriaxone day 5 of 10:  Repeat blood cultures negative Will transition to Ceftin  2  Pulmonary toilet as indicated above  3  Outpatient pulmonary follow-up at [facility]        Subjective: Zechariah Davidson was seen lying in bed upon entering the room  He does report from his shortness of breath  He reports cough that is nonproductive  Denies:  Chest pain, pain inspiration, fevers, chills, night sweats, nausea, vomiting, bronchospasm hemoptysis    Objective:         Vitals: Blood pressure 145/75, pulse 71, temperature 97 8 °F (36 6 °C), temperature source Temporal, resp  rate 18, weight 115 kg (253 lb 8 5 oz), SpO2 94 %  , 3LNC, There is no height or weight on file to calculate BMI        Intake/Output Summary (Last 24 hours) at 3/4/2019 1224  Last data filed at 3/4/2019 0001  Gross per 24 hour   Intake    Output 950 ml   Net -950 ml         Physical Exam  Gen: Awake, alert, oriented x 3, no acute distress  HEENT: Mucous membranes moist, no oral lesions, no thrush, oxygen via NC  NECK: noaccessory muscle use, JVP not elevated  Cardiac: Regular, single S1, single S2, no murmurs, no rubs, no gallops  Lungs: decreased breath sounds bilateral bases  Abdomen: obese, normoactive bowel sounds, soft nontender, nondistended, no rebound or rigidity, no guarding  Extremities: no cyanosis, no clubbing, no edema    Labs: I have personally reviewed pertinent lab results  , CBC:   Lab Results   Component Value Date    WBC 6 35 03/04/2019    HGB 10 5 (L) 03/04/2019    HCT 33 5 (L) 03/04/2019    MCV 95 03/04/2019     03/04/2019    MCH 29 9 03/04/2019    MCHC 31 3 (L) 03/04/2019    RDW 13 8 03/04/2019    MPV 9 5 03/04/2019   , CMP:   Lab Results   Component Value Date    SODIUM 142 03/04/2019    K 4 0 03/04/2019     03/04/2019    CO2 33 (H) 03/04/2019    BUN 20 03/04/2019    CREATININE 0 73 03/04/2019    CALCIUM 8 4 03/04/2019    EGFR 89 03/04/2019     Imaging and other studies: none      Emiliano Trivedi

## 2019-03-05 VITALS
DIASTOLIC BLOOD PRESSURE: 87 MMHG | OXYGEN SATURATION: 94 % | SYSTOLIC BLOOD PRESSURE: 165 MMHG | TEMPERATURE: 97.6 F | WEIGHT: 253.53 LBS | HEART RATE: 71 BPM | RESPIRATION RATE: 20 BRPM

## 2019-03-05 LAB
BACTERIA BLD CULT: NORMAL
GLUCOSE SERPL-MCNC: 87 MG/DL (ref 65–140)

## 2019-03-05 PROCEDURE — 82948 REAGENT STRIP/BLOOD GLUCOSE: CPT

## 2019-03-05 PROCEDURE — 99239 HOSP IP/OBS DSCHRG MGMT >30: CPT | Performed by: INTERNAL MEDICINE

## 2019-03-05 PROCEDURE — 94760 N-INVAS EAR/PLS OXIMETRY 1: CPT

## 2019-03-05 PROCEDURE — 94640 AIRWAY INHALATION TREATMENT: CPT

## 2019-03-05 RX ORDER — CEFUROXIME AXETIL 500 MG/1
500 TABLET ORAL EVERY 12 HOURS SCHEDULED
Qty: 10 TABLET | Refills: 0
Start: 2019-03-05 | End: 2019-03-10

## 2019-03-05 RX ORDER — PREDNISONE 10 MG/1
TABLET ORAL
Refills: 0
Start: 2019-03-05

## 2019-03-05 RX ADMIN — LEVALBUTEROL 1.25 MG: 1.25 SOLUTION, CONCENTRATE RESPIRATORY (INHALATION) at 08:09

## 2019-03-05 RX ADMIN — DILTIAZEM HYDROCHLORIDE 120 MG: 120 CAPSULE, COATED, EXTENDED RELEASE ORAL at 10:00

## 2019-03-05 RX ADMIN — CEFUROXIME AXETIL 500 MG: 250 TABLET ORAL at 09:59

## 2019-03-05 RX ADMIN — APIXABAN 5 MG: 5 TABLET, FILM COATED ORAL at 10:00

## 2019-03-05 RX ADMIN — BUDESONIDE 0.5 MG: 0.5 INHALANT RESPIRATORY (INHALATION) at 08:09

## 2019-03-05 RX ADMIN — IPRATROPIUM BROMIDE 0.5 MG: 0.5 SOLUTION RESPIRATORY (INHALATION) at 08:09

## 2019-03-05 RX ADMIN — LEVOTHYROXINE SODIUM 200 MCG: 100 TABLET ORAL at 05:20

## 2019-03-05 RX ADMIN — LEVETIRACETAM 500 MG: 500 TABLET, FILM COATED ORAL at 10:00

## 2019-03-05 RX ADMIN — GABAPENTIN 100 MG: 100 CAPSULE ORAL at 10:00

## 2019-03-05 NOTE — PLAN OF CARE
Problem: Potential for Falls  Goal: Patient will remain free of falls  Description  INTERVENTIONS:  - Assess patient frequently for physical needs  -  Identify cognitive and physical deficits and behaviors that affect risk of falls    -  [place] fall precautions as indicated by assessment   - Educate patient/family on patient safety including physical limitations  - Instruct patient to call for assistance with activity based on assessment  - Modify environment to reduce risk of injury  - Consider OT/PT consult to assist with strengthening/mobility  Outcome: Progressing     Problem: Prexisting or High Potential for Compromised Skin Integrity  Goal: Skin integrity is maintained or improved  Description  INTERVENTIONS:  - Identify patients at risk for skin breakdown  - Assess and monitor skin integrity  - Assess and monitor nutrition and hydration status  - Monitor labs (i e  albumin)  - Assess for incontinence   - Turn and reposition patient  - Assist with mobility/ambulation  - Relieve pressure over bony prominences  - Avoid friction and shearing  - Provide appropriate hygiene as needed including keeping skin clean and dry  - Evaluate need for skin moisturizer/barrier cream  - Collaborate with interdisciplinary team (i e  Nutrition, Rehabilitation, etc )   - Patient/family teaching  Outcome: Progressing     Problem: PAIN - ADULT  Goal: Verbalizes/displays adequate comfort level or baseline comfort level  Description  Interventions:  - Encourage patient to monitor pain and request assistance  - Assess pain using appropriate pain scale  - Administer analgesics based on type and severity of pain and evaluate response  - Implement non-pharmacological measures as appropriate and evaluate response  - Consider cultural and social influences on pain and pain management  - Notify physician/advanced practitioner if interventions unsuccessful or patient reports new pain  Outcome: Progressing     Problem: INFECTION - ADULT  Goal: Absence or prevention of progression during hospitalization  Description  INTERVENTIONS:  - Assess and monitor for signs and symptoms of infection  - Monitor lab/diagnostic results  - Monitor all insertion sites, i e  indwelling lines, tubes, and drains  - Monitor endotracheal (as able) and nasal secretions for changes in amount and color  - [place] appropriate cooling/warming therapies per order  - Administer medications as ordered  - Instruct and encourage patient and family to use good hand hygiene technique  - Identify and instruct in appropriate isolation precautions for identified infection/condition  Outcome: Progressing     Problem: DISCHARGE PLANNING  Goal: Discharge to home or other facility with appropriate resources  Description  INTERVENTIONS:  - Identify barriers to discharge w/patient and caregiver  - Arrange for needed discharge resources and transportation as appropriate  - Identify discharge learning needs (meds, wound care, etc )  - Arrange for interpretive services to assist at discharge as needed  - Refer to Case Management Department for coordinating discharge planning if the patient needs post-hospital services based on physician/advanced practitioner order or complex needs related to functional status, cognitive ability, or social support system  Outcome: Progressing     Problem: Knowledge Deficit  Goal: Patient/family/caregiver demonstrates understanding of disease process, treatment plan, medications, and discharge instructions  Description  Complete learning assessment and assess knowledge base    Interventions:  - Provide teaching at level of understanding  - Provide teaching via preferred learning methods  Outcome: Progressing     Problem: RESPIRATORY - ADULT  Goal: Achieves optimal ventilation and oxygenation  Description  INTERVENTIONS:  - Assess for changes in respiratory status  - Assess for changes in mentation and behavior  - Position to facilitate oxygenation and minimize respiratory effort  - Oxygen administration by appropriate delivery method based on oxygen saturation (per order) or ABGs  - Initiate smoking cessation education as indicated  - Encourage broncho-pulmonary hygiene including cough, deep breathe, Incentive Spirometry  - Assess the need for suctioning and aspirate as needed  - Assess and instruct to report SOB or any respiratory difficulty  - Respiratory Therapy support as indicated  Outcome: Progressing     Problem: GENITOURINARY - ADULT  Goal: Maintains or returns to baseline urinary function  Description  INTERVENTIONS:  - Assess urinary function  - Encourage oral fluids to ensure adequate hydration  - Administer IV fluids as ordered to ensure adequate hydration  - Administer ordered medications as needed  - Offer frequent toileting  - Follow urinary retention protocol if ordered  Outcome: Progressing  Goal: Absence of urinary retention  Description  INTERVENTIONS:  - Assess patient?s ability to void and empty bladder  - Monitor I/O  - Bladder scan as needed  - Discuss with physician/AP medications to alleviate retention as needed  - Discuss catheterization for long term situations as appropriate  Outcome: Progressing  Goal: Urinary catheter remains patent  Description  INTERVENTIONS:  - Assess patency of urinary catheter  - If patient has a chronic gao, consider changing catheter if non-functioning  - Follow guidelines for intermittent irrigation of non-functioning urinary catheter  Outcome: Progressing     Problem: SAFETY ADULT  Goal: Patient will remain free of falls  Description  INTERVENTIONS:  - Assess patient frequently for physical needs  -  Identify cognitive and physical deficits and behaviors that affect risk of falls    -  [place] fall precautions as indicated by assessment   - Educate patient/family on patient safety including physical limitations  - Instruct patient to call for assistance with activity based on assessment  - Modify environment to reduce risk of injury  - Consider OT/PT consult to assist with strengthening/mobility  Outcome: Progressing     Problem: NEUROSENSORY - ADULT  Goal: Achieves stable or improved neurological status  Description  INTERVENTIONS  - Monitor and report changes in neurological status  - Initiate measures to prevent increased intracranial pressure  - Maintain blood pressure and fluid volume within ordered parameters to optimize cerebral perfusion  - Monitor temperature, glucose, and sodium or any other associated labs  Initiate appropriate interventions as ordered  - Monitor for seizure activity   - Administer anti-seizure medications as ordered  Outcome: Progressing     Problem: CARDIOVASCULAR - ADULT  Goal: Maintains optimal cardiac output and hemodynamic stability  Description  INTERVENTIONS:  - Monitor I/O, vital signs and rhythm  - Monitor for S/S and trends of decreased cardiac output i e  bleeding, hypotension  - Administer and titrate ordered vasoactive medications to optimize hemodynamic stability  - Assess quality of pulses, skin color and temperature  - Assess for signs of decreased coronary artery perfusion - ex   Angina  - Instruct patient to report change in severity of symptoms  Outcome: Progressing     Problem: METABOLIC, FLUID AND ELECTROLYTES - ADULT  Goal: Electrolytes maintained within normal limits  Description  INTERVENTIONS:  - Monitor labs and assess patient for signs and symptoms of electrolyte imbalances  - Administer electrolyte replacement as ordered  - Monitor response to electrolyte replacements, including repeat lab results as appropriate  - Instruct patient on fluid and nutrition as appropriate  Outcome: Progressing  Goal: Glucose maintained within target range  Description  INTERVENTIONS:  - Monitor Blood Glucose as ordered  - Assess for signs and symptoms of hyperglycemia and hypoglycemia  - Administer ordered medications to maintain glucose within target range  - Assess nutritional intake and initiate nutrition service referral as needed  Outcome: Progressing     Problem: SKIN/TISSUE INTEGRITY - ADULT  Goal: Skin integrity remains intact  Description  INTERVENTIONS  - Identify patients at risk for skin breakdown  - Assess and monitor skin integrity  - Assess and monitor nutrition and hydration status  - Monitor labs (i e  albumin)  - Assess for incontinence   - Turn and reposition patient  - Assist with mobility/ambulation  - Relieve pressure over bony prominences  - Avoid friction and shearing  - Provide appropriate hygiene as needed including keeping skin clean and dry  - Evaluate need for skin moisturizer/barrier cream  - Collaborate with interdisciplinary team (i e  Nutrition, Rehabilitation, etc )   - Patient/family teaching  Outcome: Progressing     Problem: Nutrition/Hydration-ADULT  Goal: Nutrient/Hydration intake appropriate for improving, restoring or maintaining nutritional needs  Description  Monitor and assess patient's nutrition/hydration status for malnutrition (ex- brittle hair, bruises, dry skin, pale skin and conjunctiva, muscle wasting, smooth red tongue, and disorientation)  Collaborate with interdisciplinary team and initiate plan and interventions as ordered  Monitor patient's weight and dietary intake as ordered or per policy  Utilize nutrition screening tool and intervene per policy  Determine patient's food preferences and provide high-protein, high-caloric foods as appropriate       INTERVENTIONS:  - Monitor oral intake, urinary output, labs, and treatment plans  - Assess nutrition and hydration status and recommend course of action  - Evaluate amount of meals eaten  - Assist patient with eating if necessary   - Allow adequate time for meals  - Recommend/ encourage appropriate diets, oral nutritional supplements, and vitamin/mineral supplements  - Order, calculate, and assess calorie counts as needed  - Recommend, monitor, and adjust tube feedings and TPN/PPN based on assessed needs  - Assess need for intravenous fluids  - Provide specific nutrition/hydration education as appropriate  - Include patient/family/caregiver in decisions related to nutrition  Outcome: Progressing     Problem: DISCHARGE PLANNING - CARE MANAGEMENT  Goal: Discharge to post-acute care or home with appropriate resources  Description  INTERVENTIONS:  - Conduct assessment to determine patient/family and health care team treatment goals, and need for post-acute services based on payer coverage, community resources, and patient preferences, and barriers to discharge  - Address psychosocial, clinical, and financial barriers to discharge as identified in assessment in conjunction with the patient/family and health care team  - Arrange appropriate level of post-acute services according to patient?s   needs and preference and payer coverage in collaboration with the physician and health care team  - Communicate with and update the patient/family, physician, and health care team regarding progress on the discharge plan  - Arrange appropriate transportation to post-acute venues  Outcome: Progressing

## 2019-03-05 NOTE — PLAN OF CARE
Problem: Potential for Falls  Goal: Patient will remain free of falls  Description  INTERVENTIONS:  - Assess patient frequently for physical needs  -  Identify cognitive and physical deficits and behaviors that affect risk of falls    -  [place] fall precautions as indicated by assessment   - Educate patient/family on patient safety including physical limitations  - Instruct patient to call for assistance with activity based on assessment  - Modify environment to reduce risk of injury  - Consider OT/PT consult to assist with strengthening/mobility  Outcome: Progressing     Problem: Prexisting or High Potential for Compromised Skin Integrity  Goal: Skin integrity is maintained or improved  Description  INTERVENTIONS:  - Identify patients at risk for skin breakdown  - Assess and monitor skin integrity  - Assess and monitor nutrition and hydration status  - Monitor labs (i e  albumin)  - Assess for incontinence   - Turn and reposition patient  - Assist with mobility/ambulation  - Relieve pressure over bony prominences  - Avoid friction and shearing  - Provide appropriate hygiene as needed including keeping skin clean and dry  - Evaluate need for skin moisturizer/barrier cream  - Collaborate with interdisciplinary team (i e  Nutrition, Rehabilitation, etc )   - Patient/family teaching  Outcome: Progressing     Problem: PAIN - ADULT  Goal: Verbalizes/displays adequate comfort level or baseline comfort level  Description  Interventions:  - Encourage patient to monitor pain and request assistance  - Assess pain using appropriate pain scale  - Administer analgesics based on type and severity of pain and evaluate response  - Implement non-pharmacological measures as appropriate and evaluate response  - Consider cultural and social influences on pain and pain management  - Notify physician/advanced practitioner if interventions unsuccessful or patient reports new pain  Outcome: Progressing     Problem: INFECTION - ADULT  Goal: Absence or prevention of progression during hospitalization  Description  INTERVENTIONS:  - Assess and monitor for signs and symptoms of infection  - Monitor lab/diagnostic results  - Monitor all insertion sites, i e  indwelling lines, tubes, and drains  - Monitor endotracheal (as able) and nasal secretions for changes in amount and color  - [place] appropriate cooling/warming therapies per order  - Administer medications as ordered  - Instruct and encourage patient and family to use good hand hygiene technique  - Identify and instruct in appropriate isolation precautions for identified infection/condition  Outcome: Progressing     Problem: DISCHARGE PLANNING  Goal: Discharge to home or other facility with appropriate resources  Description  INTERVENTIONS:  - Identify barriers to discharge w/patient and caregiver  - Arrange for needed discharge resources and transportation as appropriate  - Identify discharge learning needs (meds, wound care, etc )  - Arrange for interpretive services to assist at discharge as needed  - Refer to Case Management Department for coordinating discharge planning if the patient needs post-hospital services based on physician/advanced practitioner order or complex needs related to functional status, cognitive ability, or social support system  Outcome: Progressing     Problem: Knowledge Deficit  Goal: Patient/family/caregiver demonstrates understanding of disease process, treatment plan, medications, and discharge instructions  Description  Complete learning assessment and assess knowledge base    Interventions:  - Provide teaching at level of understanding  - Provide teaching via preferred learning methods  Outcome: Progressing     Problem: RESPIRATORY - ADULT  Goal: Achieves optimal ventilation and oxygenation  Description  INTERVENTIONS:  - Assess for changes in respiratory status  - Assess for changes in mentation and behavior  - Position to facilitate oxygenation and minimize respiratory effort  - Oxygen administration by appropriate delivery method based on oxygen saturation (per order) or ABGs  - Initiate smoking cessation education as indicated  - Encourage broncho-pulmonary hygiene including cough, deep breathe, Incentive Spirometry  - Assess the need for suctioning and aspirate as needed  - Assess and instruct to report SOB or any respiratory difficulty  - Respiratory Therapy support as indicated  Outcome: Progressing     Problem: GENITOURINARY - ADULT  Goal: Maintains or returns to baseline urinary function  Description  INTERVENTIONS:  - Assess urinary function  - Encourage oral fluids to ensure adequate hydration  - Administer IV fluids as ordered to ensure adequate hydration  - Administer ordered medications as needed  - Offer frequent toileting  - Follow urinary retention protocol if ordered  Outcome: Progressing  Goal: Absence of urinary retention  Description  INTERVENTIONS:  - Assess patient?s ability to void and empty bladder  - Monitor I/O  - Bladder scan as needed  - Discuss with physician/AP medications to alleviate retention as needed  - Discuss catheterization for long term situations as appropriate  Outcome: Progressing  Goal: Urinary catheter remains patent  Description  INTERVENTIONS:  - Assess patency of urinary catheter  - If patient has a chronic gao, consider changing catheter if non-functioning  - Follow guidelines for intermittent irrigation of non-functioning urinary catheter  Outcome: Progressing     Problem: SAFETY ADULT  Goal: Patient will remain free of falls  Description  INTERVENTIONS:  - Assess patient frequently for physical needs  -  Identify cognitive and physical deficits and behaviors that affect risk of falls    -  [place] fall precautions as indicated by assessment   - Educate patient/family on patient safety including physical limitations  - Instruct patient to call for assistance with activity based on assessment  - Modify environment to reduce risk of injury  - Consider OT/PT consult to assist with strengthening/mobility  Outcome: Progressing     Problem: NEUROSENSORY - ADULT  Goal: Achieves stable or improved neurological status  Description  INTERVENTIONS  - Monitor and report changes in neurological status  - Initiate measures to prevent increased intracranial pressure  - Maintain blood pressure and fluid volume within ordered parameters to optimize cerebral perfusion  - Monitor temperature, glucose, and sodium or any other associated labs  Initiate appropriate interventions as ordered  - Monitor for seizure activity   - Administer anti-seizure medications as ordered  Outcome: Progressing     Problem: CARDIOVASCULAR - ADULT  Goal: Maintains optimal cardiac output and hemodynamic stability  Description  INTERVENTIONS:  - Monitor I/O, vital signs and rhythm  - Monitor for S/S and trends of decreased cardiac output i e  bleeding, hypotension  - Administer and titrate ordered vasoactive medications to optimize hemodynamic stability  - Assess quality of pulses, skin color and temperature  - Assess for signs of decreased coronary artery perfusion - ex   Angina  - Instruct patient to report change in severity of symptoms  Outcome: Progressing     Problem: METABOLIC, FLUID AND ELECTROLYTES - ADULT  Goal: Electrolytes maintained within normal limits  Description  INTERVENTIONS:  - Monitor labs and assess patient for signs and symptoms of electrolyte imbalances  - Administer electrolyte replacement as ordered  - Monitor response to electrolyte replacements, including repeat lab results as appropriate  - Instruct patient on fluid and nutrition as appropriate  Outcome: Progressing  Goal: Glucose maintained within target range  Description  INTERVENTIONS:  - Monitor Blood Glucose as ordered  - Assess for signs and symptoms of hyperglycemia and hypoglycemia  - Administer ordered medications to maintain glucose within target range  - Assess nutritional intake and initiate nutrition service referral as needed  Outcome: Progressing     Problem: SKIN/TISSUE INTEGRITY - ADULT  Goal: Skin integrity remains intact  Description  INTERVENTIONS  - Identify patients at risk for skin breakdown  - Assess and monitor skin integrity  - Assess and monitor nutrition and hydration status  - Monitor labs (i e  albumin)  - Assess for incontinence   - Turn and reposition patient  - Assist with mobility/ambulation  - Relieve pressure over bony prominences  - Avoid friction and shearing  - Provide appropriate hygiene as needed including keeping skin clean and dry  - Evaluate need for skin moisturizer/barrier cream  - Collaborate with interdisciplinary team (i e  Nutrition, Rehabilitation, etc )   - Patient/family teaching  Outcome: Progressing     Problem: Nutrition/Hydration-ADULT  Goal: Nutrient/Hydration intake appropriate for improving, restoring or maintaining nutritional needs  Description  Monitor and assess patient's nutrition/hydration status for malnutrition (ex- brittle hair, bruises, dry skin, pale skin and conjunctiva, muscle wasting, smooth red tongue, and disorientation)  Collaborate with interdisciplinary team and initiate plan and interventions as ordered  Monitor patient's weight and dietary intake as ordered or per policy  Utilize nutrition screening tool and intervene per policy  Determine patient's food preferences and provide high-protein, high-caloric foods as appropriate       INTERVENTIONS:  - Monitor oral intake, urinary output, labs, and treatment plans  - Assess nutrition and hydration status and recommend course of action  - Evaluate amount of meals eaten  - Assist patient with eating if necessary   - Allow adequate time for meals  - Recommend/ encourage appropriate diets, oral nutritional supplements, and vitamin/mineral supplements  - Order, calculate, and assess calorie counts as needed  - Recommend, monitor, and adjust tube feedings and TPN/PPN based on assessed needs  - Assess need for intravenous fluids  - Provide specific nutrition/hydration education as appropriate  - Include patient/family/caregiver in decisions related to nutrition  Outcome: Progressing     Problem: DISCHARGE PLANNING - CARE MANAGEMENT  Goal: Discharge to post-acute care or home with appropriate resources  Description  INTERVENTIONS:  - Conduct assessment to determine patient/family and health care team treatment goals, and need for post-acute services based on payer coverage, community resources, and patient preferences, and barriers to discharge  - Address psychosocial, clinical, and financial barriers to discharge as identified in assessment in conjunction with the patient/family and health care team  - Arrange appropriate level of post-acute services according to patient?s   needs and preference and payer coverage in collaboration with the physician and health care team  - Communicate with and update the patient/family, physician, and health care team regarding progress on the discharge plan  - Arrange appropriate transportation to post-acute venues  Outcome: Progressing

## 2019-03-05 NOTE — PLAN OF CARE
Problem: Potential for Falls  Goal: Patient will remain free of falls  Description  INTERVENTIONS:  - Assess patient frequently for physical needs  -  Identify cognitive and physical deficits and behaviors that affect risk of falls    -  [place] fall precautions as indicated by assessment   - Educate patient/family on patient safety including physical limitations  - Instruct patient to call for assistance with activity based on assessment  - Modify environment to reduce risk of injury  - Consider OT/PT consult to assist with strengthening/mobility  3/5/2019 1059 by Melissa Crews RN  Outcome: Adequate for Discharge  3/5/2019 1058 by Melissa Crews RN  Outcome: Adequate for Discharge  3/5/2019 0756 by Melissa Crews RN  Outcome: Progressing     Problem: Prexisting or High Potential for Compromised Skin Integrity  Goal: Skin integrity is maintained or improved  Description  INTERVENTIONS:  - Identify patients at risk for skin breakdown  - Assess and monitor skin integrity  - Assess and monitor nutrition and hydration status  - Monitor labs (i e  albumin)  - Assess for incontinence   - Turn and reposition patient  - Assist with mobility/ambulation  - Relieve pressure over bony prominences  - Avoid friction and shearing  - Provide appropriate hygiene as needed including keeping skin clean and dry  - Evaluate need for skin moisturizer/barrier cream  - Collaborate with interdisciplinary team (i e  Nutrition, Rehabilitation, etc )   - Patient/family teaching  3/5/2019 1059 by Melissa Crews RN  Outcome: Adequate for Discharge  3/5/2019 1058 by Melissa Crews RN  Outcome: Adequate for Discharge  3/5/2019 0756 by Melissa Crews RN  Outcome: Progressing     Problem: PAIN - ADULT  Goal: Verbalizes/displays adequate comfort level or baseline comfort level  Description  Interventions:  - Encourage patient to monitor pain and request assistance  - Assess pain using appropriate pain scale  - Administer analgesics based on type and severity of pain and evaluate response  - Implement non-pharmacological measures as appropriate and evaluate response  - Consider cultural and social influences on pain and pain management  - Notify physician/advanced practitioner if interventions unsuccessful or patient reports new pain  3/5/2019 1059 by Matthew Maloney RN  Outcome: Adequate for Discharge  3/5/2019 1058 by Matthew Maloney RN  Outcome: Adequate for Discharge  3/5/2019 0756 by Matthew Maloney RN  Outcome: Progressing     Problem: INFECTION - ADULT  Goal: Absence or prevention of progression during hospitalization  Description  INTERVENTIONS:  - Assess and monitor for signs and symptoms of infection  - Monitor lab/diagnostic results  - Monitor all insertion sites, i e  indwelling lines, tubes, and drains  - Monitor endotracheal (as able) and nasal secretions for changes in amount and color  - [place] appropriate cooling/warming therapies per order  - Administer medications as ordered  - Instruct and encourage patient and family to use good hand hygiene technique  - Identify and instruct in appropriate isolation precautions for identified infection/condition  3/5/2019 1059 by Matthew Maloney RN  Outcome: Adequate for Discharge  3/5/2019 1058 by Matthew Maloney RN  Outcome: Adequate for Discharge  3/5/2019 0756 by Matthew Maloney RN  Outcome: Progressing     Problem: DISCHARGE PLANNING  Goal: Discharge to home or other facility with appropriate resources  Description  INTERVENTIONS:  - Identify barriers to discharge w/patient and caregiver  - Arrange for needed discharge resources and transportation as appropriate  - Identify discharge learning needs (meds, wound care, etc )  - Arrange for interpretive services to assist at discharge as needed  - Refer to Case Management Department for coordinating discharge planning if the patient needs post-hospital services based on physician/advanced practitioner order or complex needs related to functional status, cognitive ability, or social support system  3/5/2019 1059 by Bg Lim RN  Outcome: Adequate for Discharge  3/5/2019 1058 by Bg Lim RN  Outcome: Adequate for Discharge  3/5/2019 0756 by Bg Lim RN  Outcome: Progressing     Problem: Knowledge Deficit  Goal: Patient/family/caregiver demonstrates understanding of disease process, treatment plan, medications, and discharge instructions  Description  Complete learning assessment and assess knowledge base    Interventions:  - Provide teaching at level of understanding  - Provide teaching via preferred learning methods  3/5/2019 1059 by Bg Lim RN  Outcome: Adequate for Discharge  3/5/2019 1058 by Bg Lim RN  Outcome: Adequate for Discharge  3/5/2019 0756 by Bg Lim RN  Outcome: Progressing     Problem: RESPIRATORY - ADULT  Goal: Achieves optimal ventilation and oxygenation  Description  INTERVENTIONS:  - Assess for changes in respiratory status  - Assess for changes in mentation and behavior  - Position to facilitate oxygenation and minimize respiratory effort  - Oxygen administration by appropriate delivery method based on oxygen saturation (per order) or ABGs  - Initiate smoking cessation education as indicated  - Encourage broncho-pulmonary hygiene including cough, deep breathe, Incentive Spirometry  - Assess the need for suctioning and aspirate as needed  - Assess and instruct to report SOB or any respiratory difficulty  - Respiratory Therapy support as indicated  3/5/2019 1059 by Bg Lim RN  Outcome: Adequate for Discharge  3/5/2019 1058 by Bg Lim RN  Outcome: Adequate for Discharge  3/5/2019 0756 by Bg Lim RN  Outcome: Progressing     Problem: GENITOURINARY - ADULT  Goal: Maintains or returns to baseline urinary function  Description  INTERVENTIONS:  - Assess urinary function  - Encourage oral fluids to ensure adequate hydration  - Administer IV fluids as ordered to ensure adequate hydration  - Administer ordered medications as needed  - Offer frequent toileting  - Follow urinary retention protocol if ordered  3/5/2019 1059 by Cookie Mcgraw RN  Outcome: Adequate for Discharge  3/5/2019 1058 by Cookie Mcgraw RN  Outcome: Adequate for Discharge  3/5/2019 0756 by Cookie Mcgraw RN  Outcome: Progressing  Goal: Absence of urinary retention  Description  INTERVENTIONS:  - Assess patient?s ability to void and empty bladder  - Monitor I/O  - Bladder scan as needed  - Discuss with physician/AP medications to alleviate retention as needed  - Discuss catheterization for long term situations as appropriate  3/5/2019 1059 by Cookie Mcgraw RN  Outcome: Adequate for Discharge  3/5/2019 1058 by Cookie Mcgraw RN  Outcome: Adequate for Discharge  3/5/2019 0756 by Cookie Mcgraw RN  Outcome: Progressing  Goal: Urinary catheter remains patent  Description  INTERVENTIONS:  - Assess patency of urinary catheter  - If patient has a chronic gao, consider changing catheter if non-functioning  - Follow guidelines for intermittent irrigation of non-functioning urinary catheter  3/5/2019 1059 by Cookie Mcgraw RN  Outcome: Adequate for Discharge  3/5/2019 1058 by Cookie Mcgraw RN  Outcome: Adequate for Discharge  3/5/2019 0756 by Cookie Mcgraw RN  Outcome: Progressing     Problem: SAFETY ADULT  Goal: Patient will remain free of falls  Description  INTERVENTIONS:  - Assess patient frequently for physical needs  -  Identify cognitive and physical deficits and behaviors that affect risk of falls    -  [place] fall precautions as indicated by assessment   - Educate patient/family on patient safety including physical limitations  - Instruct patient to call for assistance with activity based on assessment  - Modify environment to reduce risk of injury  - Consider OT/PT consult to assist with strengthening/mobility  3/5/2019 1059 by Cookie Mcgraw RN  Outcome: Adequate for Discharge  3/5/2019 1058 by Cookie Mcgraw RN  Outcome: Adequate for Discharge  3/5/2019 0756 by Cookie Mcgraw RN  Outcome: Progressing     Problem: NEUROSENSORY - ADULT  Goal: Achieves stable or improved neurological status  Description  INTERVENTIONS  - Monitor and report changes in neurological status  - Initiate measures to prevent increased intracranial pressure  - Maintain blood pressure and fluid volume within ordered parameters to optimize cerebral perfusion  - Monitor temperature, glucose, and sodium or any other associated labs  Initiate appropriate interventions as ordered  - Monitor for seizure activity   - Administer anti-seizure medications as ordered  3/5/2019 1059 by Angela Shetty RN  Outcome: Adequate for Discharge  3/5/2019 1058 by Angela Shetty RN  Outcome: Adequate for Discharge  3/5/2019 0756 by Angela Shetty RN  Outcome: Progressing     Problem: CARDIOVASCULAR - ADULT  Goal: Maintains optimal cardiac output and hemodynamic stability  Description  INTERVENTIONS:  - Monitor I/O, vital signs and rhythm  - Monitor for S/S and trends of decreased cardiac output i e  bleeding, hypotension  - Administer and titrate ordered vasoactive medications to optimize hemodynamic stability  - Assess quality of pulses, skin color and temperature  - Assess for signs of decreased coronary artery perfusion - ex   Angina  - Instruct patient to report change in severity of symptoms  3/5/2019 1059 by Angela Shetty RN  Outcome: Adequate for Discharge  3/5/2019 1058 by Angela Shetty RN  Outcome: Adequate for Discharge  3/5/2019 0756 by Angela Shetty RN  Outcome: Progressing     Problem: METABOLIC, FLUID AND ELECTROLYTES - ADULT  Goal: Electrolytes maintained within normal limits  Description  INTERVENTIONS:  - Monitor labs and assess patient for signs and symptoms of electrolyte imbalances  - Administer electrolyte replacement as ordered  - Monitor response to electrolyte replacements, including repeat lab results as appropriate  - Instruct patient on fluid and nutrition as appropriate  3/5/2019 1059 by Angela Shetty RN  Outcome: Adequate for Discharge  3/5/2019 1058 by Melissa Crews RN  Outcome: Adequate for Discharge  3/5/2019 0756 by Melissa Crews RN  Outcome: Progressing  Goal: Glucose maintained within target range  Description  INTERVENTIONS:  - Monitor Blood Glucose as ordered  - Assess for signs and symptoms of hyperglycemia and hypoglycemia  - Administer ordered medications to maintain glucose within target range  - Assess nutritional intake and initiate nutrition service referral as needed  3/5/2019 1059 by Melissa Crews RN  Outcome: Adequate for Discharge  3/5/2019 1058 by Melissa Crews RN  Outcome: Adequate for Discharge  3/5/2019 0756 by Melissa Crews RN  Outcome: Progressing     Problem: SKIN/TISSUE INTEGRITY - ADULT  Goal: Skin integrity remains intact  Description  INTERVENTIONS  - Identify patients at risk for skin breakdown  - Assess and monitor skin integrity  - Assess and monitor nutrition and hydration status  - Monitor labs (i e  albumin)  - Assess for incontinence   - Turn and reposition patient  - Assist with mobility/ambulation  - Relieve pressure over bony prominences  - Avoid friction and shearing  - Provide appropriate hygiene as needed including keeping skin clean and dry  - Evaluate need for skin moisturizer/barrier cream  - Collaborate with interdisciplinary team (i e  Nutrition, Rehabilitation, etc )   - Patient/family teaching  3/5/2019 1059 by Melissa Crews RN  Outcome: Adequate for Discharge  3/5/2019 1058 by Melissa Crews RN  Outcome: Adequate for Discharge  3/5/2019 0756 by Melissa Crews RN  Outcome: Progressing     Problem: Nutrition/Hydration-ADULT  Goal: Nutrient/Hydration intake appropriate for improving, restoring or maintaining nutritional needs  Description  Monitor and assess patient's nutrition/hydration status for malnutrition (ex- brittle hair, bruises, dry skin, pale skin and conjunctiva, muscle wasting, smooth red tongue, and disorientation)   Collaborate with interdisciplinary team and initiate plan and interventions as ordered  Monitor patient's weight and dietary intake as ordered or per policy  Utilize nutrition screening tool and intervene per policy  Determine patient's food preferences and provide high-protein, high-caloric foods as appropriate       INTERVENTIONS:  - Monitor oral intake, urinary output, labs, and treatment plans  - Assess nutrition and hydration status and recommend course of action  - Evaluate amount of meals eaten  - Assist patient with eating if necessary   - Allow adequate time for meals  - Recommend/ encourage appropriate diets, oral nutritional supplements, and vitamin/mineral supplements  - Order, calculate, and assess calorie counts as needed  - Recommend, monitor, and adjust tube feedings and TPN/PPN based on assessed needs  - Assess need for intravenous fluids  - Provide specific nutrition/hydration education as appropriate  - Include patient/family/caregiver in decisions related to nutrition  3/5/2019 1059 by Melissa Crews RN  Outcome: Adequate for Discharge  3/5/2019 1058 by Melissa Crews RN  Outcome: Adequate for Discharge  3/5/2019 0756 by Melissa Crews, RN  Outcome: Progressing

## 2019-03-05 NOTE — PLAN OF CARE
Problem: DISCHARGE PLANNING - CARE MANAGEMENT  Goal: Discharge to post-acute care or home with appropriate resources  Description  INTERVENTIONS:  - Conduct assessment to determine patient/family and health care team treatment goals, and need for post-acute services based on payer coverage, community resources, and patient preferences, and barriers to discharge  - Address psychosocial, clinical, and financial barriers to discharge as identified in assessment in conjunction with the patient/family and health care team  - Arrange appropriate level of post-acute services according to patient?s   needs and preference and payer coverage in collaboration with the physician and health care team  - Communicate with and update the patient/family, physician, and health care team regarding progress on the discharge plan  - Arrange appropriate transportation to post-acute venues  Outcome: Progressing     Problem: Potential for Falls  Goal: Patient will remain free of falls  Description  INTERVENTIONS:  - Assess patient frequently for physical needs  -  Identify cognitive and physical deficits and behaviors that affect risk of falls    -  [place] fall precautions as indicated by assessment   - Educate patient/family on patient safety including physical limitations  - Instruct patient to call for assistance with activity based on assessment  - Modify environment to reduce risk of injury  - Consider OT/PT consult to assist with strengthening/mobility  3/5/2019 1058 by Matthew Maloney, RN  Outcome: Adequate for Discharge  3/5/2019 0756 by Matthew Maloney RN  Outcome: Progressing     Problem: Prexisting or High Potential for Compromised Skin Integrity  Goal: Skin integrity is maintained or improved  Description  INTERVENTIONS:  - Identify patients at risk for skin breakdown  - Assess and monitor skin integrity  - Assess and monitor nutrition and hydration status  - Monitor labs (i e  albumin)  - Assess for incontinence   - Turn and reposition patient  - Assist with mobility/ambulation  - Relieve pressure over bony prominences  - Avoid friction and shearing  - Provide appropriate hygiene as needed including keeping skin clean and dry  - Evaluate need for skin moisturizer/barrier cream  - Collaborate with interdisciplinary team (i e  Nutrition, Rehabilitation, etc )   - Patient/family teaching  3/5/2019 1058 by Phoebe Benjamin RN  Outcome: Adequate for Discharge  3/5/2019 0756 by Phoebe Benjamin RN  Outcome: Progressing     Problem: PAIN - ADULT  Goal: Verbalizes/displays adequate comfort level or baseline comfort level  Description  Interventions:  - Encourage patient to monitor pain and request assistance  - Assess pain using appropriate pain scale  - Administer analgesics based on type and severity of pain and evaluate response  - Implement non-pharmacological measures as appropriate and evaluate response  - Consider cultural and social influences on pain and pain management  - Notify physician/advanced practitioner if interventions unsuccessful or patient reports new pain  3/5/2019 1058 by Phoebe Benjamin RN  Outcome: Adequate for Discharge  3/5/2019 0756 by Phoebe Benjamin RN  Outcome: Progressing     Problem: INFECTION - ADULT  Goal: Absence or prevention of progression during hospitalization  Description  INTERVENTIONS:  - Assess and monitor for signs and symptoms of infection  - Monitor lab/diagnostic results  - Monitor all insertion sites, i e  indwelling lines, tubes, and drains  - Monitor endotracheal (as able) and nasal secretions for changes in amount and color  - [place] appropriate cooling/warming therapies per order  - Administer medications as ordered  - Instruct and encourage patient and family to use good hand hygiene technique  - Identify and instruct in appropriate isolation precautions for identified infection/condition  3/5/2019 1058 by Phoebe Benjamin RN  Outcome: Adequate for Discharge  3/5/2019 0756 by Phoebe Benjamin RN  Outcome: Progressing     Problem: DISCHARGE PLANNING  Goal: Discharge to home or other facility with appropriate resources  Description  INTERVENTIONS:  - Identify barriers to discharge w/patient and caregiver  - Arrange for needed discharge resources and transportation as appropriate  - Identify discharge learning needs (meds, wound care, etc )  - Arrange for interpretive services to assist at discharge as needed  - Refer to Case Management Department for coordinating discharge planning if the patient needs post-hospital services based on physician/advanced practitioner order or complex needs related to functional status, cognitive ability, or social support system  3/5/2019 1058 by Bg Lim RN  Outcome: Adequate for Discharge  3/5/2019 0756 by Bg Lim RN  Outcome: Progressing     Problem: Knowledge Deficit  Goal: Patient/family/caregiver demonstrates understanding of disease process, treatment plan, medications, and discharge instructions  Description  Complete learning assessment and assess knowledge base    Interventions:  - Provide teaching at level of understanding  - Provide teaching via preferred learning methods  3/5/2019 1058 by Bg Lim RN  Outcome: Adequate for Discharge  3/5/2019 0756 by Bg Lim RN  Outcome: Progressing     Problem: RESPIRATORY - ADULT  Goal: Achieves optimal ventilation and oxygenation  Description  INTERVENTIONS:  - Assess for changes in respiratory status  - Assess for changes in mentation and behavior  - Position to facilitate oxygenation and minimize respiratory effort  - Oxygen administration by appropriate delivery method based on oxygen saturation (per order) or ABGs  - Initiate smoking cessation education as indicated  - Encourage broncho-pulmonary hygiene including cough, deep breathe, Incentive Spirometry  - Assess the need for suctioning and aspirate as needed  - Assess and instruct to report SOB or any respiratory difficulty  - Respiratory Therapy support as indicated  3/5/2019 1058 by Shannan Duarte RN  Outcome: Adequate for Discharge  3/5/2019 0756 by Shannan Duarte RN  Outcome: Progressing     Problem: GENITOURINARY - ADULT  Goal: Maintains or returns to baseline urinary function  Description  INTERVENTIONS:  - Assess urinary function  - Encourage oral fluids to ensure adequate hydration  - Administer IV fluids as ordered to ensure adequate hydration  - Administer ordered medications as needed  - Offer frequent toileting  - Follow urinary retention protocol if ordered  3/5/2019 1058 by Shannan Duarte RN  Outcome: Adequate for Discharge  3/5/2019 0756 by Shannan Duarte RN  Outcome: Progressing  Goal: Absence of urinary retention  Description  INTERVENTIONS:  - Assess patient?s ability to void and empty bladder  - Monitor I/O  - Bladder scan as needed  - Discuss with physician/AP medications to alleviate retention as needed  - Discuss catheterization for long term situations as appropriate  3/5/2019 1058 by Shannan Duarte RN  Outcome: Adequate for Discharge  3/5/2019 0756 by Shannan Duarte RN  Outcome: Progressing  Goal: Urinary catheter remains patent  Description  INTERVENTIONS:  - Assess patency of urinary catheter  - If patient has a chronic gao, consider changing catheter if non-functioning  - Follow guidelines for intermittent irrigation of non-functioning urinary catheter  3/5/2019 1058 by Shannan Duarte RN  Outcome: Adequate for Discharge  3/5/2019 0756 by Shannan Duarte RN  Outcome: Progressing     Problem: SAFETY ADULT  Goal: Patient will remain free of falls  Description  INTERVENTIONS:  - Assess patient frequently for physical needs  -  Identify cognitive and physical deficits and behaviors that affect risk of falls    -  [place] fall precautions as indicated by assessment   - Educate patient/family on patient safety including physical limitations  - Instruct patient to call for assistance with activity based on assessment  - Modify environment to reduce risk of injury  - Consider OT/PT consult to assist with strengthening/mobility  3/5/2019 1058 by Jocelynn Beatty RN  Outcome: Adequate for Discharge  3/5/2019 0756 by Jocelynn Beatty RN  Outcome: Progressing     Problem: NEUROSENSORY - ADULT  Goal: Achieves stable or improved neurological status  Description  INTERVENTIONS  - Monitor and report changes in neurological status  - Initiate measures to prevent increased intracranial pressure  - Maintain blood pressure and fluid volume within ordered parameters to optimize cerebral perfusion  - Monitor temperature, glucose, and sodium or any other associated labs  Initiate appropriate interventions as ordered  - Monitor for seizure activity   - Administer anti-seizure medications as ordered  3/5/2019 1058 by Jocelynn Beatty RN  Outcome: Adequate for Discharge  3/5/2019 0756 by Jocelynn Beatty RN  Outcome: Progressing     Problem: CARDIOVASCULAR - ADULT  Goal: Maintains optimal cardiac output and hemodynamic stability  Description  INTERVENTIONS:  - Monitor I/O, vital signs and rhythm  - Monitor for S/S and trends of decreased cardiac output i e  bleeding, hypotension  - Administer and titrate ordered vasoactive medications to optimize hemodynamic stability  - Assess quality of pulses, skin color and temperature  - Assess for signs of decreased coronary artery perfusion - ex   Angina  - Instruct patient to report change in severity of symptoms  3/5/2019 1058 by Jocelynn Beatty RN  Outcome: Adequate for Discharge  3/5/2019 0756 by Jocelynn Beatty RN  Outcome: Progressing     Problem: METABOLIC, FLUID AND ELECTROLYTES - ADULT  Goal: Electrolytes maintained within normal limits  Description  INTERVENTIONS:  - Monitor labs and assess patient for signs and symptoms of electrolyte imbalances  - Administer electrolyte replacement as ordered  - Monitor response to electrolyte replacements, including repeat lab results as appropriate  - Instruct patient on fluid and nutrition as appropriate  3/5/2019 1058 by Pia Henson RN  Outcome: Adequate for Discharge  3/5/2019 0756 by Pia Henson RN  Outcome: Progressing  Goal: Glucose maintained within target range  Description  INTERVENTIONS:  - Monitor Blood Glucose as ordered  - Assess for signs and symptoms of hyperglycemia and hypoglycemia  - Administer ordered medications to maintain glucose within target range  - Assess nutritional intake and initiate nutrition service referral as needed  3/5/2019 1058 by Pia Henson RN  Outcome: Adequate for Discharge  3/5/2019 0756 by Pia Henson RN  Outcome: Progressing     Problem: SKIN/TISSUE INTEGRITY - ADULT  Goal: Skin integrity remains intact  Description  INTERVENTIONS  - Identify patients at risk for skin breakdown  - Assess and monitor skin integrity  - Assess and monitor nutrition and hydration status  - Monitor labs (i e  albumin)  - Assess for incontinence   - Turn and reposition patient  - Assist with mobility/ambulation  - Relieve pressure over bony prominences  - Avoid friction and shearing  - Provide appropriate hygiene as needed including keeping skin clean and dry  - Evaluate need for skin moisturizer/barrier cream  - Collaborate with interdisciplinary team (i e  Nutrition, Rehabilitation, etc )   - Patient/family teaching  3/5/2019 1058 by Pia Henson RN  Outcome: Adequate for Discharge  3/5/2019 0756 by Pia Henson RN  Outcome: Progressing     Problem: Nutrition/Hydration-ADULT  Goal: Nutrient/Hydration intake appropriate for improving, restoring or maintaining nutritional needs  Description  Monitor and assess patient's nutrition/hydration status for malnutrition (ex- brittle hair, bruises, dry skin, pale skin and conjunctiva, muscle wasting, smooth red tongue, and disorientation)  Collaborate with interdisciplinary team and initiate plan and interventions as ordered  Monitor patient's weight and dietary intake as ordered or per policy  Utilize nutrition screening tool and intervene per policy   Determine patient's food preferences and provide high-protein, high-caloric foods as appropriate       INTERVENTIONS:  - Monitor oral intake, urinary output, labs, and treatment plans  - Assess nutrition and hydration status and recommend course of action  - Evaluate amount of meals eaten  - Assist patient with eating if necessary   - Allow adequate time for meals  - Recommend/ encourage appropriate diets, oral nutritional supplements, and vitamin/mineral supplements  - Order, calculate, and assess calorie counts as needed  - Recommend, monitor, and adjust tube feedings and TPN/PPN based on assessed needs  - Assess need for intravenous fluids  - Provide specific nutrition/hydration education as appropriate  - Include patient/family/caregiver in decisions related to nutrition  3/5/2019 1058 by Keven Vicente RN  Outcome: Adequate for Discharge  3/5/2019 0756 by Keven Vicente, RN  Outcome: Progressing

## 2019-03-05 NOTE — TELEPHONE ENCOUNTER
Abiola from [facility] calling- aware trying to get apt within 4 weeks at [facility]    Any questions please call: [phone]

## 2019-03-05 NOTE — DISCHARGE SUMMARY
Discharge Summary - Kaitlynn Urrutia [birth year], [id number]        Admission Date: 2/28/2019  Discharge Date: 3/5/2019    Admitting Diagnosis: Acute respiratory failure ([address] ) [J96 00]  Urinary retention [R33 9]  Pneumonia [J18 9]  Unresponsive [R41 89]  Sepsis ([address] ) [A41 9]  Displacement of Guzman catheter ([address] ) [W36 083F]    Discharge Diagnosis:   1  Pneumonia  2  Sepsis secondary to 1   3  Acute hypoxic respiratory failure secondary to 1   4  COPD  5  Atrial fibrillation  6  Hypothyroidism  7  Acute metabolic encephalopathy secondary to 1   8  Traumatic Guzman catheterization  9  Anemia  10  Severe obesity   11  History of seizure disorder  12  Bipolar affective disorder     Consulting Physicians:  Dr Monserrat Jeff, urology    Procedures Performed:   Suprapubic catheter    HPI:  The patient is a 19-year-old man who was brought to the emergency room following a respiratory arrest at his nursing home  He was walking with his walker when he suddenly fell  He was unresponsive  When EMS arrived he had a pulse but was hypoxic  He was intubated in the field  He was admitted to the intensive care unit  In the emergency room a CT scan began of the chest, abdomen and pelvis was performed  This showed no pulmonary embolism or major intra-abdominal issue but did show multi lobar pneumonia  He was admitted to ICU for further care  Hospital Course: The patient was admitted to ICU under the service of Dr Buddy Brar  He was on a ventilator for a time  He was treated with antibiotics, bronchodilators, etc   His respiratory status improved and he was successfully extubated  He was transferred to a medical-surgical floor for additional care where he continued to slowly improve  At the time of admission the patient was obtunded  After extubation his mental status was not at its usual baseline for a few days suggesting a acute metabolic/toxic encephalopathy  As mention this did improve during his hospitalization      At the time of admission an attempt to place a Guzman catheter was made  Unfortunately, this was traumatic  Ultimately, a suprapubic tube was placed by Dr Amilcar Venegas   This is still present at the time of discharge and will require outpatient urologic follow-up  The patient's other medical issues were generally stable throughout his stay  His usual psychotropics were maintained with the exception that buspirone was stopped because of sedation  On the day of discharge the patient was feeling pretty well  Vital signs were stable  Lungs were clear  Cardiac exam revealed an irregular rhythm  The abdomen was soft without tenderness  There was no edema  He was still on supplemental oxygen  Disposition:  The patient was transferred to [facility] on March 5th  He will be on diet and activity as tolerated  He will be under the care of the physicians at [facility] [place] stay there  Discharge instructions/Information to patient and family:   See after visit summary for information provided to patient and family  Provisions for Follow-Up Care:  See after visit summary for information related to follow-up care and any pertinent home health orders  Planned Readmission: No    Discharge Statement   I spent 40 minutes discharging the patient  This time was spent on the day of discharge  I had direct contact with the patient on the day of discharge  Discharge Medications:  See after visit summary for reconciled discharge medications provided to patient and family

## 2019-03-05 NOTE — TELEPHONE ENCOUNTER
Called and spoke to Freeman Gomes at [facility], updated that message has been sent to our practice coordinator to locate an appointment for in office cystoscopy with MD in 4 weeks  Also advised patient will need imaging, VCUG done prior to visit  Advised her that clerical team will call back with appointment for cysto and scheduling for imaging  Patient is not available on the following dates due to other medical appointments:  3/26/19, 3/29/19 , and 4/11/19  [place] care would like our office to schedule the VCUG and provide them with appointment information for imaging and cysto  Advised them clerical team will follow up with [facility] within the next few days with scheduling information  Copy of VCUG order was faxed to [facility] at [phone] for their records

## 2019-03-05 NOTE — NURSING NOTE
Reviewed discharge instructions and report provided to [facility]  Spoke to Jahaira Reagan RN  All questions addressed, no concerns  Patient transported via [facility] in stable condition on chronic 3L oxygen with suprapubic catheter in place  IV removed, VSS, patient discharged

## 2019-03-07 NOTE — TELEPHONE ENCOUNTER
[facility] called requesting an appointment with doctor for follow up as soon as possible for patient to discuss SP tube and other questions  Please assist in scheduling patient  This is not for cysto

## 2019-03-07 NOTE — TELEPHONE ENCOUNTER
I scheduled VCUG for 3/18 ( this was the first available apt ) Pt was transferred to [place] [address] location  The phone # is [phone]  I spoke to them and confirmed VCUG and cysto apt

## 2019-03-08 LAB
BACTERIA BLD CULT: NORMAL
BACTERIA BLD CULT: NORMAL

## 2019-03-18 ENCOUNTER — HOSPITAL ENCOUNTER (OUTPATIENT)
Dept: RADIOLOGY | Facility: HOSPITAL | Age: 79
Discharge: HOME | End: 2019-03-18
Attending: UROLOGY

## 2019-03-20 ENCOUNTER — TELEPHONE (OUTPATIENT)
Dept: UROLOGY | Facility: AMBULATORY SURGERY CENTER | Age: 79
End: 2019-03-20

## 2019-03-20 NOTE — TELEPHONE ENCOUNTER
Damaris Singleton, patient recently hospitalized at [address] [place] [address] [address] for respiratory distress  Advised patient is to be rescheduled for VCUG, patient was no show on 3/18/19 due to recent hospitalization, provided Quentin Bartholomew with number for central scheduling and instructed her to call to reschedule the VCUG, order in [address] [place] [address] Rd  Rescheduled patient for cysto on 4/17/19 at 11:30 Am with DR Magallanes [facility]

## 2019-03-20 NOTE — TELEPHONE ENCOUNTER
SNF called to cancel cysto she said patient was just returned from hospital and she will have unit clerk call to r/s his procedure

## 2019-03-20 NOTE — TELEPHONE ENCOUNTER
Gloria Portillo - Calling to reschedule cysto appointment unable to accommodate please review for time frame

## 2019-03-21 NOTE — TELEPHONE ENCOUNTER
Dr David Cruz called stating sp/tube insertion site looks infected and requesting removal asking clinical call [facility] [phone] and speak with Lidia Romano or Oriana Sheth, if needing to speak with him directly his number provided is his cell

## 2019-03-21 NOTE — TELEPHONE ENCOUNTER
Patient was inpatient consult, post traumatic gao catheter placement , s/p bedside suprapubic catheter placement 3/2/19  [facility] and spoke to DR Melanie Rubalcava  Patient was recently admitted at [facility] for hypoglycemia, hypoxia  Currently scheduled for VCUG on 4/10/19 and cysto 4/17/19 ( rescheduled due to hospital admission)    Patient is now at [facility] and is complaining of severe pain at SPT site, there is redness at the site,  SPT is draining clear yellow urine  Patient is afebrile , no chills, however in severe pain and would like SPT removed  Advised Dr Melanie Rubalcava that message will be sent to provider to review  DR Melanie Rubalcava can be reached at [phone]  Dr Melanie Rubalcava would like to know if patient should go to ER for admission

## 2019-03-21 NOTE — TELEPHONE ENCOUNTER
Suprapubic catheter should not be removed as he was in urinary retention and no other bladder drainage could be established    Patient can be seen in our office as a nursing visit for suprapubic catheter exchange/up sizing today if needed

## 2019-03-21 NOTE — TELEPHONE ENCOUNTER
Called and spoke to Dr Ted Tolentino, advised that it is not recommended to have SPT removed as he was in urinary retention and no other bladder drainage could be established  Advised recommendation is for patient to be scheduled for nurse visit for SPT change/upsize  Dr Ted Tolentino in agreement  Spoke to [facility], at [facility], they are unable to arrange transport for today or tomorrow  Patient scheduled for nursing visit on Monday 3/25/19 at 1 pm in [place] location when provider is available  [place] [facility] will arrange transport and aware of location  If patient has worsening pain / redness they will go to ER for sooner evaluation  Nurse states patient is comfortable at this time

## 2019-03-25 ENCOUNTER — PROCEDURE VISIT (OUTPATIENT)
Dept: UROLOGY | Facility: AMBULATORY SURGERY CENTER | Age: 79
End: 2019-03-25

## 2019-03-25 VITALS — SYSTOLIC BLOOD PRESSURE: 150 MMHG | HEART RATE: 80 BPM | DIASTOLIC BLOOD PRESSURE: 88 MMHG

## 2019-03-25 DIAGNOSIS — Z93.59 SUPRAPUBIC CATHETER (HCC): Primary | ICD-10-CM

## 2019-03-25 NOTE — PROGRESS NOTES
Patient presented to office today from hospitals for what was scheduled as SPT upsize  When patient's current SPT was examined, it was discovered patient had 10Fr catheter in place  This could not be upsized in the office  Sydney Borrero looked at the catheter and agreed it could not be upsized  Spoke with Dr Marya Soler, who advised to let [facility] do a clamping of current catheter to see if patient is able to void on his own  Do not remove current tube in case it would need to be upsized in the future  Will write orders and fax to [facility]  Ambulance transport was made aware and will f/u with [facility]      Amanda Mosley

## 2019-03-26 ENCOUNTER — TELEPHONE (OUTPATIENT)
Dept: UROLOGY | Facility: AMBULATORY SURGERY CENTER | Age: 79
End: 2019-03-26

## 2019-03-26 DIAGNOSIS — Z93.59 SUPRAPUBIC CATHETER (HCC): Primary | ICD-10-CM

## 2019-03-26 NOTE — TELEPHONE ENCOUNTER
Micah Schneider dr [facility] called had some questions about his upcoming appointment   She can be reached at [phone]

## 2019-03-26 NOTE — TELEPHONE ENCOUNTER
Patient was seen in [place] yesterday for SPT upsize, however per note when patient arrived he had a 10 FR catheter in place which could not be upsized in the office  This was evaluated by Magdalene Brown and discussed with DR Blackwell, [facility] given order for clamping void trial (current catheter to remain in place in case needs to be upsized) to see if patient is able to urinate on his own  Per MD cosign will determine whether suprapubic tube will be required long term after voiding trial  If long term placement needed patient to be referred to IR  Spoke to Dano Tobin at [facility] she called today to see if patient needed to proceed with scheduled VCUG and cystoscopy  She states void trial is being performed today  Advised her to have [facility] contact our office with results of void trial to further determine recommendation

## 2019-03-28 NOTE — TELEPHONE ENCOUNTER
Robb Wise at [facility] and requested nurse caring for patient to call our office back to give update on patient voiding trial    Advised to call main number back [phone] and speak to clinical

## 2019-03-28 NOTE — TELEPHONE ENCOUNTER
Reviewed instructions with STEVENSON May  Orders written and faxed to [address] [place] [address] at [phone],  Voiding trial tomorrow  Nurse at [facility] states it is currently change of shift  Orders faxed:  Unclamp SPT tube tomorrow AM, measure amount of urine drained from SPT at time of unclamping, if greater than 300 ml returns keep SPT to drainage bag,  If <300 ml reclamp SPT  [address] [place] [address] to call with results   Orders signed by STEVENSON May and faxed and reviewed verbally over phone with Nurse Elo Hartley at [address] [place] [address]

## 2019-03-28 NOTE — TELEPHONE ENCOUNTER
[facility], spoke to nurse Jahaira Reagan at [phone] to get update  SPT was clamped on Tuesday 3/29/19  Patient is voiding and is grossly incontinent  Denies any discomfort  SPT remains clamped  Patient is currently scheduled for VCUG on 4/10/19 and cystoscopy on 4/17/19 in [facility]  Advised will check with provider if these appointments should be kept as scheduled  Advised her message will be sent to provider to review and clinical will call back with recommendations

## 2019-03-29 NOTE — TELEPHONE ENCOUNTER
It sounds as though he is not emptying his bladder well  Leave it open and can arrange IR SPT upsizing

## 2019-03-29 NOTE — TELEPHONE ENCOUNTER
[facility] and spoke to nurse, Fanta, who is caring for patient today  She is unsure status of SPT at this time, whether it is capped or uncapped  Patient left their facility early this morning at 7:45 AM for pulmonary function testing and is also scheduled for CT of chest this morning at 10:30 AM   She will call me this afternoon with update on status of SPT tube and void trial when patient returns from his testing

## 2019-03-29 NOTE — TELEPHONE ENCOUNTER
Received call from [address] [place] [facility]  He clarified following for void trial     Tuesday 3/26/19 - SPT was clamped for 6 hours,  Patient voided small amount volitionally and was also incontinent in  Diaper while SPT clamped  When SPT was unclamped there was little to no urine from the tube  Today 3/29/19, SPT clamped for about 6 hours,  Patient continues to have incontinence , diapers saturated while tube is clamped  When tube unclamped only few drops of urine from tube  Patient is comfortable, no abdominal distention  SPT is currently to drainage  Advised update will be sent to provider

## 2019-03-29 NOTE — TELEPHONE ENCOUNTER
Can you enter order for IR upsize and I will contact [facility] and cancel his scheduled VCUG and cystoscopy appointments

## 2019-04-02 NOTE — TELEPHONE ENCOUNTER
Called Brea at [facility], advised if patient/ family request further intervention in the future he would need a consult with IR  Confirmed with Brea at this time patient is comfort care and no intervention needed  I called central scheduling and cancelled VCUG on 4/10/19,  Aishwarya Olivas will cancel transport for testing with [facility]  Patient has no follow up needed at this time

## 2019-04-02 NOTE — TELEPHONE ENCOUNTER
I spoke to Don Call, nurse at [facility], he called this morning to report that patient pulled out his SPT last night at the facility, he states that site is closed  Patient is incontinent and has been placed on [address], DNR  Nurse states he will  have [facility]   call central scheduling to cancel pre existing VCUG appt on 4/10/19 and transport  His appt for cysto on 4/17/19 cancelled  Advised him that message will be sent to provider for update and for further recommendation